# Patient Record
Sex: FEMALE | Race: BLACK OR AFRICAN AMERICAN | Employment: OTHER | URBAN - METROPOLITAN AREA
[De-identification: names, ages, dates, MRNs, and addresses within clinical notes are randomized per-mention and may not be internally consistent; named-entity substitution may affect disease eponyms.]

---

## 2021-01-20 ENCOUNTER — APPOINTMENT (OUTPATIENT)
Dept: GENERAL RADIOLOGY | Age: 76
DRG: 064 | End: 2021-01-20
Attending: EMERGENCY MEDICINE
Payer: MEDICARE

## 2021-01-20 ENCOUNTER — HOSPITAL ENCOUNTER (INPATIENT)
Age: 76
LOS: 6 days | Discharge: NURSING, REHAB OR LONG-TERM CARE | DRG: 064 | End: 2021-01-26
Attending: EMERGENCY MEDICINE | Admitting: INTERNAL MEDICINE
Payer: MEDICARE

## 2021-01-20 ENCOUNTER — APPOINTMENT (OUTPATIENT)
Dept: CT IMAGING | Age: 76
DRG: 064 | End: 2021-01-20
Attending: EMERGENCY MEDICINE
Payer: MEDICARE

## 2021-01-20 DIAGNOSIS — R53.1 WEAKNESS: ICD-10-CM

## 2021-01-20 DIAGNOSIS — R41.82 ALTERED MENTAL STATUS, UNSPECIFIED ALTERED MENTAL STATUS TYPE: Primary | ICD-10-CM

## 2021-01-20 DIAGNOSIS — Z96.652 STATUS POST TOTAL LEFT KNEE REPLACEMENT: ICD-10-CM

## 2021-01-20 LAB
ALBUMIN SERPL-MCNC: 3.4 G/DL (ref 3.5–5)
ALBUMIN/GLOB SERPL: 0.8 {RATIO} (ref 1.1–2.2)
ALP SERPL-CCNC: 75 U/L (ref 45–117)
ALT SERPL-CCNC: 31 U/L (ref 12–78)
ANION GAP SERPL CALC-SCNC: 8 MMOL/L (ref 5–15)
APPEARANCE UR: CLEAR
AST SERPL W P-5'-P-CCNC: 29 U/L (ref 15–37)
ATRIAL RATE: 75 BPM
BACTERIA URNS QL MICRO: NEGATIVE /HPF
BASOPHILS # BLD: 0 K/UL (ref 0–0.1)
BASOPHILS NFR BLD: 1 % (ref 0–1)
BILIRUB SERPL-MCNC: 0.4 MG/DL (ref 0.2–1)
BILIRUB UR QL: NEGATIVE
BNP SERPL-MCNC: 121 PG/ML
BUN SERPL-MCNC: 34 MG/DL (ref 6–20)
BUN/CREAT SERPL: 23 (ref 12–20)
CA-I BLD-MCNC: 9.3 MG/DL (ref 8.5–10.1)
CALCULATED R AXIS, ECG10: 44 DEGREES
CALCULATED T AXIS, ECG11: 57 DEGREES
CHLORIDE SERPL-SCNC: 110 MMOL/L (ref 97–108)
CO2 SERPL-SCNC: 28 MMOL/L (ref 21–32)
COLOR UR: ABNORMAL
COVID-19 RAPID TEST, COVR: NOT DETECTED
CREAT SERPL-MCNC: 1.48 MG/DL (ref 0.55–1.02)
DIAGNOSIS, 93000: NORMAL
DIFFERENTIAL METHOD BLD: ABNORMAL
EOSINOPHIL # BLD: 0.2 K/UL (ref 0–0.4)
EOSINOPHIL NFR BLD: 5 % (ref 0–7)
ERYTHROCYTE [DISTWIDTH] IN BLOOD BY AUTOMATED COUNT: 15 % (ref 11.5–14.5)
ETHANOL SERPL-MCNC: <4 MG/DL
FLUAV AG NPH QL IA: NEGATIVE
FLUBV AG NOSE QL IA: NEGATIVE
GLOBULIN SER CALC-MCNC: 4.4 G/DL (ref 2–4)
GLUCOSE SERPL-MCNC: 102 MG/DL (ref 65–100)
GLUCOSE UR STRIP.AUTO-MCNC: NEGATIVE MG/DL
HCT VFR BLD AUTO: 45.2 % (ref 35–47)
HGB BLD-MCNC: 14.2 G/DL (ref 11.5–16)
HGB UR QL STRIP: NEGATIVE
IMM GRANULOCYTES # BLD AUTO: 0 K/UL (ref 0–0.04)
IMM GRANULOCYTES NFR BLD AUTO: 0 % (ref 0–0.5)
KETONES UR QL STRIP.AUTO: NEGATIVE MG/DL
LEUKOCYTE ESTERASE UR QL STRIP.AUTO: NEGATIVE
LYMPHOCYTES # BLD: 2 K/UL (ref 0.8–3.5)
LYMPHOCYTES NFR BLD: 47 % (ref 12–49)
MCH RBC QN AUTO: 28.7 PG (ref 26–34)
MCHC RBC AUTO-ENTMCNC: 31.4 G/DL (ref 30–36.5)
MCV RBC AUTO: 91.3 FL (ref 80–99)
MONOCYTES # BLD: 0.6 K/UL (ref 0–1)
MONOCYTES NFR BLD: 14 % (ref 5–13)
MUCOUS THREADS URNS QL MICRO: ABNORMAL /LPF
NEUTS SEG # BLD: 1.4 K/UL (ref 1.8–8)
NEUTS SEG NFR BLD: 33 % (ref 32–75)
NITRITE UR QL STRIP.AUTO: NEGATIVE
NRBC # BLD: 0 K/UL (ref 0–0.01)
NRBC BLD-RTO: 0 PER 100 WBC
PH UR STRIP: 5 [PH] (ref 5–8)
PLATELET # BLD AUTO: 304 K/UL (ref 150–400)
PMV BLD AUTO: 11 FL (ref 8.9–12.9)
POTASSIUM SERPL-SCNC: 4.4 MMOL/L (ref 3.5–5.1)
PROT SERPL-MCNC: 7.8 G/DL (ref 6.4–8.2)
PROT UR STRIP-MCNC: NEGATIVE MG/DL
Q-T INTERVAL, ECG07: 434 MS
QRS DURATION, ECG06: 88 MS
QTC CALCULATION (BEZET), ECG08: 451 MS
RBC # BLD AUTO: 4.95 M/UL (ref 3.8–5.2)
RBC #/AREA URNS HPF: ABNORMAL /HPF (ref 0–5)
SARS-COV-2, COV2: NORMAL
SODIUM SERPL-SCNC: 146 MMOL/L (ref 136–145)
SP GR UR REFRACTOMETRY: >1.03 (ref 1–1.03)
SPECIMEN SOURCE: NORMAL
TROPONIN I SERPL-MCNC: <0.05 NG/ML
UA: UC IF INDICATED,UAUC: ABNORMAL
UROBILINOGEN UR QL STRIP.AUTO: 2 EU/DL (ref 0.1–1)
VENTRICULAR RATE, ECG03: 65 BPM
WBC # BLD AUTO: 4.3 K/UL (ref 3.6–11)
WBC URNS QL MICRO: ABNORMAL /HPF (ref 0–4)

## 2021-01-20 PROCEDURE — 84484 ASSAY OF TROPONIN QUANT: CPT

## 2021-01-20 PROCEDURE — 65270000029 HC RM PRIVATE

## 2021-01-20 PROCEDURE — 87804 INFLUENZA ASSAY W/OPTIC: CPT

## 2021-01-20 PROCEDURE — 80053 COMPREHEN METABOLIC PANEL: CPT

## 2021-01-20 PROCEDURE — 85025 COMPLETE CBC W/AUTO DIFF WBC: CPT

## 2021-01-20 PROCEDURE — 71045 X-RAY EXAM CHEST 1 VIEW: CPT

## 2021-01-20 PROCEDURE — 80307 DRUG TEST PRSMV CHEM ANLYZR: CPT

## 2021-01-20 PROCEDURE — 82077 ASSAY SPEC XCP UR&BREATH IA: CPT

## 2021-01-20 PROCEDURE — 87635 SARS-COV-2 COVID-19 AMP PRB: CPT

## 2021-01-20 PROCEDURE — 81001 URINALYSIS AUTO W/SCOPE: CPT

## 2021-01-20 PROCEDURE — 74011000250 HC RX REV CODE- 250: Performed by: EMERGENCY MEDICINE

## 2021-01-20 PROCEDURE — 70450 CT HEAD/BRAIN W/O DYE: CPT

## 2021-01-20 PROCEDURE — 74011250636 HC RX REV CODE- 250/636: Performed by: EMERGENCY MEDICINE

## 2021-01-20 PROCEDURE — 93005 ELECTROCARDIOGRAM TRACING: CPT

## 2021-01-20 PROCEDURE — 83880 ASSAY OF NATRIURETIC PEPTIDE: CPT

## 2021-01-20 RX ORDER — TRIAMTERENE AND HYDROCHLOROTHIAZIDE 37.5; 25 MG/1; MG/1
1 CAPSULE ORAL DAILY
COMMUNITY
End: 2021-01-26

## 2021-01-20 RX ORDER — DILTIAZEM HYDROCHLORIDE 120 MG/1
CAPSULE, COATED, EXTENDED RELEASE ORAL DAILY
COMMUNITY
End: 2021-01-26

## 2021-01-20 RX ORDER — BACLOFEN 10 MG/1
TABLET ORAL 3 TIMES DAILY
COMMUNITY
End: 2021-01-26

## 2021-01-20 RX ORDER — AMIODARONE HYDROCHLORIDE 400 MG/1
400 TABLET ORAL DAILY
COMMUNITY
End: 2021-01-26

## 2021-01-20 RX ORDER — OXYCODONE HYDROCHLORIDE 10 MG/1
10 TABLET ORAL
COMMUNITY
End: 2021-01-26

## 2021-01-20 RX ADMIN — CEFTRIAXONE SODIUM 1 G: 1 INJECTION, POWDER, FOR SOLUTION INTRAMUSCULAR; INTRAVENOUS at 21:20

## 2021-01-20 RX ADMIN — AZITHROMYCIN DIHYDRATE 500 MG: 500 INJECTION, POWDER, LYOPHILIZED, FOR SOLUTION INTRAVENOUS at 21:21

## 2021-01-20 NOTE — ED PROVIDER NOTES
EMERGENCY DEPARTMENT HISTORY AND PHYSICAL EXAM        Date: 1/20/2021  Patient Name: Dyana Alexadner    History of Presenting Illness     Chief Complaint   Patient presents with    Altered mental status     History Provided By: Patient    HPI: Dyana Alexander, 77 y.o. female with history of COPD, hypertension, and stroke who presents with confusion and generalized weakness. Patient has been slower to respond according to family. I spoke with the daughter on the phone. She states that she has been weak since yesterday. She was having difficulty with walking. She also states that she has been more confused than normal.    PCP: Estrellita Yeager MD    Current Outpatient Medications   Medication Sig Dispense Refill    acetaminophen (TYLENOL) 500 mg tablet Take 1 Tab by mouth every four (4) hours (while awake). 100 Tab 0    senna-docusate (PERICOLACE) 8.6-50 mg per tablet Take 1 Tab by mouth two (2) times a day. 60 Tab 0    oxyCODONE IR (ROXICODONE) 5 mg immediate release tablet Take 1-2 Tabs by mouth every four (4) hours as needed for Pain. Max Daily Amount: 60 mg. 100 Tab 0    warfarin (COUMADIN) 2 mg tablet Take 2.5 Tabs by mouth daily. Adjust dose as directed. Indications: DEEP VEIN THROMBOSIS PREVENTION 90 Tab 1    flecainide (TAMBOCOR) 50 mg tablet Take 50 mg by mouth two (2) times a day.  losartan (COZAAR) 50 mg tablet Take 50 mg by mouth daily.  atorvastatin (LIPITOR) 20 mg tablet Take 10 mg by mouth nightly.  fluticasone-salmeterol (ADVAIR) 250-50 mcg/dose diskus inhaler Take 1 Puff by inhalation every twelve (12) hours.  albuterol (PROVENTIL HFA, VENTOLIN HFA, PROAIR HFA) 90 mcg/actuation inhaler Take  by inhalation every four (4) hours as needed for Wheezing.      TERRENCE Renee ARTHRITIS TAKES PRN         Past History     Past Medical History:  Past Medical History:   Diagnosis Date    Arthritis     Chronic obstructive pulmonary disease ([address]     Hypertension     Stroke [facility]) 2011    NO RESIDUAL       Past Surgical History:  Past Surgical History:   Procedure Laterality Date    HX ORTHOPAEDIC Left 2012       Family History:  Family History   Problem Relation Age of Onset    Cancer Mother         RECTAL    Hypertension Mother     Heart Disease Mother     Heart Attack Father     Anesth Problems Neg Hx     Cancer Sister         BONE    Hypertension Sister     Other Sister         ANEURYSM    Cancer Brother         THROAT       Social History:  Social History     Tobacco Use    Smoking status: Former Smoker     Packs/day: 1.50     Years: 15.00     Pack years: 22.50     Quit date: 2012     Years since quittin.0    Smokeless tobacco: Never Used   Substance Use Topics    Alcohol use: Yes     Comment: OCCASIONALLY    Drug use: No       Allergies:  No Known Allergies    Review of Systems   Review of Systems   Constitutional: Negative for fever. HENT: Negative for congestion. Eyes: Negative for visual disturbance. Respiratory: Negative for shortness of breath. Cardiovascular: Negative for chest pain. Gastrointestinal: Negative for abdominal pain. Genitourinary: Negative for dysuria. Musculoskeletal: Negative for arthralgias. Skin: Negative for rash. Neurological: Negative for headaches. Positive for disorientation     Physical Exam   General: No acute distress. Well-nourished. Skin: No rash. Head: Normocephalic. Atraumatic. Eye: No proptosis or conjunctival injections. Respiratory: No apparent respiratory distress. Gastrointestinal: Nondistended. Musculoskeletal: No obvious bony deformities. Neuro: Alert. No obvious focal deficits. Slow to respond to questions. No slurred speech.     Diagnostic Study Results     Labs -     Recent Results (from the past 24 hour(s))   CBC WITH AUTOMATED DIFF    Collection Time: 21  5:15 PM   Result Value Ref Range    WBC 4.3 3.6 - 11.0 K/uL    RBC 4.95 3.80 - 5.20 M/uL    HGB 14.2 11.5 - 16.0 g/dL    HCT 45.2 35.0 - 47.0 %    MCV 91.3 80.0 - 99.0 FL    MCH 28.7 26.0 - 34.0 PG    MCHC 31.4 30.0 - 36.5 g/dL    RDW 15.0 (H) 11.5 - 14.5 %    PLATELET 081 770 - 398 K/uL    MPV 11.0 8.9 - 12.9 FL    NRBC 0.0 0  WBC    ABSOLUTE NRBC 0.00 0.00 - 0.01 K/uL    NEUTROPHILS 33 32 - 75 %    LYMPHOCYTES 47 12 - 49 %    MONOCYTES 14 (H) 5 - 13 %    EOSINOPHILS 5 0 - 7 %    BASOPHILS 1 0 - 1 %    IMMATURE GRANULOCYTES 0 0.0 - 0.5 %    ABS. NEUTROPHILS 1.4 (L) 1.8 - 8.0 K/UL    ABS. LYMPHOCYTES 2.0 0.8 - 3.5 K/UL    ABS. MONOCYTES 0.6 0.0 - 1.0 K/UL    ABS. EOSINOPHILS 0.2 0.0 - 0.4 K/UL    ABS. BASOPHILS 0.0 0.0 - 0.1 K/UL    ABS. IMM. GRANS. 0.0 0.00 - 0.04 K/UL    DF AUTOMATED     METABOLIC PANEL, COMPREHENSIVE    Collection Time: 01/20/21  5:15 PM   Result Value Ref Range    Sodium 146 (H) 136 - 145 mmol/L    Potassium 4.4 3.5 - 5.1 mmol/L    Chloride 110 (H) 97 - 108 mmol/L    CO2 28 21 - 32 mmol/L    Anion gap 8 5 - 15 mmol/L    Glucose 102 (H) 65 - 100 mg/dL    BUN 34 (H) 6 - 20 mg/dL    Creatinine 1.48 (H) 0.55 - 1.02 mg/dL    BUN/Creatinine ratio 23 (H) 12 - 20      GFR est AA 42 (L) >60 ml/min/1.73m2    GFR est non-AA 34 (L) >60 ml/min/1.73m2    Calcium 9.3 8.5 - 10.1 mg/dL    Bilirubin, total 0.4 0.2 - 1.0 mg/dL    AST (SGOT) 29 15 - 37 U/L    ALT (SGPT) 31 12 - 78 U/L    Alk.  phosphatase 75 45 - 117 U/L    Protein, total 7.8 6.4 - 8.2 g/dL    Albumin 3.4 (L) 3.5 - 5.0 g/dL    Globulin 4.4 (H) 2.0 - 4.0 g/dL    A-G Ratio 0.8 (L) 1.1 - 2.2     TROPONIN I    Collection Time: 01/20/21  5:15 PM   Result Value Ref Range    Troponin-I, Qt. <0.05 <0.05 ng/mL   BNP    Collection Time: 01/20/21  5:15 PM   Result Value Ref Range    NT pro- <125 pg/mL   ETHYL ALCOHOL    Collection Time: 01/20/21  5:20 PM   Result Value Ref Range    ALCOHOL(ETHYL),SERUM <4 <10 mg/dL   INFLUENZA A & B AG (RAPID TEST)    Collection Time: 01/20/21  6:05 PM   Result Value Ref Range    Influenza A Antigen Negative Negative      Influenza B Antigen Negative Negative         Radiologic Studies -   CT HEAD WO CONT   Final Result   Impression:   1. No evidence of an acute intracranial abnormality. 2.  Multifocal chronic infarcts/encephalomalacia and evidence of chronic small   vessel ischemic changes. XR CHEST SNGL V   Final Result        CT Results  (Last 48 hours)               01/20/21 1835  CT HEAD WO CONT Final result    Impression:  Impression:   1. No evidence of an acute intracranial abnormality. 2.  Multifocal chronic infarcts/encephalomalacia and evidence of chronic small   vessel ischemic changes. Narrative:  Study: Head CT without contrast.       Clinical Indication: Altered mental status. Comparison: None available. Technique: Routine volume acquisition of the head was performed without contrast   using soft tissue and bone kernels. Coronal and sagittal reconstructions were   generated and reviewed. Dose reduction: All CT scans at this facility are   performed using dose reduction optimization techniques as appropriate to a   performed exam including the following-automated exposure control, adjustments   of mA and/or Kv according to patient size, or use of iterative reconstructive   technique. Findings:       Generalized volume loss and regions of bifrontal and right parietal lobe   encephalomalacia with commensurate prominence of the ventricles and sulci. No midline shift. The basal cisterns are patent. Chronic bilateral cerebellar   infarcts. No acute hemorrhage, abnormal extra-axial fluid, or evidence of large   territorial ischemia. Scattered foci of hypodensity involving the cerebral   hemispheric white matter are nonspecific but most commonly associated with   chronic small vessel ischemic changes. Dense intracranial vascular   calcifications. Absent native lenses. The included paranasal sinuses and mastoid air cells are   clear.  No evidence of an aggressive calvarial or extracalvarial lesion. CXR Results  (Last 48 hours)               01/20/21 1756  XR CHEST SNGL V Final result    Narrative: Indication: Weakness. AP portable upright chest radiograph 20 January 2021 1730 hours. No comparison. Underexpanded lungs. Heterogeneous bilateral airspace opacities predominating in the perihilar areas   and left lung base. These are relatively linear, more likely atelectasis than   pneumonia although pneumonia is not excluded. Mild cardiomegaly. No pneumothorax or pleural effusion. Medical Decision Making and ED Course     I reviewed the available vital signs, nursing notes, past medical history, past surgical history, family history, and social history. Vital Signs - Reviewed the patient's vital signs. Patient Vitals for the past 12 hrs:   Temp Pulse Resp BP SpO2   01/20/21 1809     97 %   01/20/21 1712 98 °F (36.7 °C) 66 20 126/89 97 %       EKG interpretation: Obtained on 1/20/2021 at 1719. Atrial fibrillation at rate of 65 bpm.  AL interval is indistinguishable. QRS duration and QTc intervals are normal.  Normal axis. Medical Decision Making:   Presented with confusion. The differential diagnosis is urinary tract infection, encephalopathy, intracranial hemorrhage, CVA, electrolyte abnormality, polypharmacy. Discussed with hospital doctor and will admit for altered mental status and weakness. Does not appear that she has had a stroke clinically. CT head is unremarkable. Urinalysis is pending. I do suspect UTI may be the cause. Does have signs of possible pneumonia on chest x-ray. Will test for COVID-19 and treat with azithromycin and Rocephin. Pneumonia is lower suspicion as patient has not had fever, cough or dyspnea. Disposition     Admitted to Dr. Sung Found    Diagnosis     Clinical impression:   1. Altered mental status, unspecified altered mental status type    2.  Weakness Attestation:  Please note that this dictation was completed with [employer], the computer voice recognition software. Quite often unanticipated grammatical, syntax, homophones, and other interpretive errors are inadvertently transcribed by the computer software. Please disregard these errors. Please excuse any errors that have escaped final proofreading. Thank you.   Florecita Chavez, DO

## 2021-01-20 NOTE — Clinical Note
Status[de-identified] INPATIENT [101]   Type of Bed: Remote Telemetry [29]   Inpatient Hospitalization Certified Necessary for the Following Reasons: 3.  Patient receiving treatment that can only be provided in an inpatient setting (further clarification in H&P documentation)   Admitting Diagnosis: Encephalopathy [[id number]]   Admitting Physician: Salomon Lujan   Attending Physician: Salomon Lujan   Estimated Length of Stay: 2 Midnights   Discharge Plan[de-identified] Home with Office Follow-up

## 2021-01-20 NOTE — ED TRIAGE NOTES
GENERALIZED WEAKNESS, GCS 15, PER FAMILY PT SEEMS TO BE SOMEWHAT ALTERED. EMS REPORTS SLOWER TO RESPOND TO ANSWER QUESTIONS ALTHOUGH CORRECT.

## 2021-01-21 ENCOUNTER — APPOINTMENT (OUTPATIENT)
Dept: MRI IMAGING | Age: 76
DRG: 064 | End: 2021-01-21
Attending: PSYCHIATRY & NEUROLOGY
Payer: MEDICARE

## 2021-01-21 PROBLEM — G93.40 ENCEPHALOPATHY: Status: ACTIVE | Noted: 2021-01-21

## 2021-01-21 LAB
AMPHET UR QL SCN: NEGATIVE
BARBITURATES UR QL SCN: NEGATIVE
BENZODIAZ UR QL: NEGATIVE
CANNABINOIDS UR QL SCN: NEGATIVE
COCAINE UR QL SCN: NEGATIVE
DRUG SCRN COMMENT,DRGCM: ABNORMAL
GLUCOSE BLD STRIP.AUTO-MCNC: 110 MG/DL (ref 65–100)
METHADONE UR QL: NEGATIVE
OPIATES UR QL: POSITIVE
PCP UR QL: NEGATIVE
PERFORMED BY, TECHID: ABNORMAL
TROPONIN I SERPL-MCNC: <0.05 NG/ML

## 2021-01-21 PROCEDURE — 74011000258 HC RX REV CODE- 258: Performed by: INTERNAL MEDICINE

## 2021-01-21 PROCEDURE — 36415 COLL VENOUS BLD VENIPUNCTURE: CPT

## 2021-01-21 PROCEDURE — 94640 AIRWAY INHALATION TREATMENT: CPT

## 2021-01-21 PROCEDURE — 74011250637 HC RX REV CODE- 250/637: Performed by: INTERNAL MEDICINE

## 2021-01-21 PROCEDURE — 84484 ASSAY OF TROPONIN QUANT: CPT

## 2021-01-21 PROCEDURE — 95816 EEG AWAKE AND DROWSY: CPT | Performed by: INTERNAL MEDICINE

## 2021-01-21 PROCEDURE — 70551 MRI BRAIN STEM W/O DYE: CPT

## 2021-01-21 PROCEDURE — 65270000029 HC RM PRIVATE

## 2021-01-21 PROCEDURE — 82962 GLUCOSE BLOOD TEST: CPT

## 2021-01-21 RX ORDER — DEXTROSE MONOHYDRATE AND SODIUM CHLORIDE 5; .45 G/100ML; G/100ML
75 INJECTION, SOLUTION INTRAVENOUS CONTINUOUS
Status: DISCONTINUED | OUTPATIENT
Start: 2021-01-21 | End: 2021-01-26 | Stop reason: HOSPADM

## 2021-01-21 RX ORDER — DILTIAZEM HYDROCHLORIDE 120 MG/1
120 CAPSULE, COATED, EXTENDED RELEASE ORAL DAILY
Status: DISCONTINUED | OUTPATIENT
Start: 2021-01-21 | End: 2021-01-26 | Stop reason: HOSPADM

## 2021-01-21 RX ORDER — ATORVASTATIN CALCIUM 10 MG/1
10 TABLET, FILM COATED ORAL
Status: DISCONTINUED | OUTPATIENT
Start: 2021-01-21 | End: 2021-01-26 | Stop reason: HOSPADM

## 2021-01-21 RX ORDER — BACLOFEN 10 MG/1
5 TABLET ORAL 3 TIMES DAILY
Status: DISCONTINUED | OUTPATIENT
Start: 2021-01-21 | End: 2021-01-26 | Stop reason: HOSPADM

## 2021-01-21 RX ORDER — AMIODARONE HYDROCHLORIDE 200 MG/1
400 TABLET ORAL DAILY
Status: DISCONTINUED | OUTPATIENT
Start: 2021-01-21 | End: 2021-01-26 | Stop reason: HOSPADM

## 2021-01-21 RX ORDER — SODIUM CHLORIDE 0.9 % (FLUSH) 0.9 %
5-40 SYRINGE (ML) INJECTION AS NEEDED
Status: DISCONTINUED | OUTPATIENT
Start: 2021-01-21 | End: 2021-01-26 | Stop reason: HOSPADM

## 2021-01-21 RX ORDER — ACETAMINOPHEN 500 MG
500 TABLET ORAL
Status: DISCONTINUED | OUTPATIENT
Start: 2021-01-21 | End: 2021-01-26 | Stop reason: HOSPADM

## 2021-01-21 RX ORDER — ALBUTEROL SULFATE 90 UG/1
2 AEROSOL, METERED RESPIRATORY (INHALATION)
Status: DISCONTINUED | OUTPATIENT
Start: 2021-01-21 | End: 2021-01-26 | Stop reason: HOSPADM

## 2021-01-21 RX ORDER — BISACODYL 5 MG
5 TABLET, DELAYED RELEASE (ENTERIC COATED) ORAL DAILY PRN
Status: DISCONTINUED | OUTPATIENT
Start: 2021-01-21 | End: 2021-01-26 | Stop reason: HOSPADM

## 2021-01-21 RX ORDER — AMOXICILLIN 250 MG
1 CAPSULE ORAL 2 TIMES DAILY
Status: DISCONTINUED | OUTPATIENT
Start: 2021-01-21 | End: 2021-01-26 | Stop reason: HOSPADM

## 2021-01-21 RX ORDER — LOSARTAN POTASSIUM 50 MG/1
50 TABLET ORAL DAILY
Status: DISCONTINUED | OUTPATIENT
Start: 2021-01-21 | End: 2021-01-26 | Stop reason: HOSPADM

## 2021-01-21 RX ORDER — SODIUM CHLORIDE 0.9 % (FLUSH) 0.9 %
5-40 SYRINGE (ML) INJECTION EVERY 8 HOURS
Status: DISCONTINUED | OUTPATIENT
Start: 2021-01-21 | End: 2021-01-26 | Stop reason: HOSPADM

## 2021-01-21 RX ORDER — TRAMADOL HYDROCHLORIDE 50 MG/1
50 TABLET ORAL
Status: DISCONTINUED | OUTPATIENT
Start: 2021-01-21 | End: 2021-01-26 | Stop reason: HOSPADM

## 2021-01-21 RX ORDER — BUDESONIDE AND FORMOTEROL FUMARATE DIHYDRATE 160; 4.5 UG/1; UG/1
2 AEROSOL RESPIRATORY (INHALATION)
Status: DISCONTINUED | OUTPATIENT
Start: 2021-01-21 | End: 2021-01-26 | Stop reason: HOSPADM

## 2021-01-21 RX ORDER — FLECAINIDE ACETATE 50 MG/1
50 TABLET ORAL 2 TIMES DAILY
Status: DISCONTINUED | OUTPATIENT
Start: 2021-01-21 | End: 2021-01-26 | Stop reason: HOSPADM

## 2021-01-21 RX ADMIN — BACLOFEN 5 MG: 10 TABLET ORAL at 21:49

## 2021-01-21 RX ADMIN — Medication 10 ML: at 14:00

## 2021-01-21 RX ADMIN — DILTIAZEM HYDROCHLORIDE 120 MG: 120 CAPSULE, COATED, EXTENDED RELEASE ORAL at 11:28

## 2021-01-21 RX ADMIN — Medication 10 ML: at 22:00

## 2021-01-21 RX ADMIN — RIVAROXABAN 20 MG: 10 TABLET, FILM COATED ORAL at 16:02

## 2021-01-21 RX ADMIN — ACETAMINOPHEN 500 MG: 500 TABLET, FILM COATED ORAL at 11:28

## 2021-01-21 RX ADMIN — AMIODARONE HYDROCHLORIDE 400 MG: 200 TABLET ORAL at 11:29

## 2021-01-21 RX ADMIN — DEXTROSE AND SODIUM CHLORIDE 75 ML/HR: 5; 450 INJECTION, SOLUTION INTRAVENOUS at 11:27

## 2021-01-21 RX ADMIN — ATORVASTATIN CALCIUM 10 MG: 10 TABLET, FILM COATED ORAL at 21:49

## 2021-01-21 RX ADMIN — FLECAINIDE ACETATE 50 MG: 50 TABLET ORAL at 11:28

## 2021-01-21 RX ADMIN — DOCUSATE SODIUM 50 MG AND SENNOSIDES 8.6 MG 1 TABLET: 8.6; 5 TABLET, FILM COATED ORAL at 11:29

## 2021-01-21 RX ADMIN — TRAMADOL HYDROCHLORIDE 50 MG: 50 TABLET, COATED ORAL at 16:02

## 2021-01-21 RX ADMIN — BACLOFEN 5 MG: 10 TABLET ORAL at 11:30

## 2021-01-21 RX ADMIN — LOSARTAN POTASSIUM 50 MG: 50 TABLET, FILM COATED ORAL at 11:28

## 2021-01-21 RX ADMIN — BUDESONIDE AND FORMOTEROL FUMARATE DIHYDRATE 2 PUFF: 160; 4.5 AEROSOL RESPIRATORY (INHALATION) at 20:46

## 2021-01-21 RX ADMIN — ACETAMINOPHEN 500 MG: 500 TABLET, FILM COATED ORAL at 21:49

## 2021-01-21 RX ADMIN — BACLOFEN 5 MG: 10 TABLET ORAL at 16:02

## 2021-01-21 RX ADMIN — FLECAINIDE ACETATE 50 MG: 50 TABLET ORAL at 21:49

## 2021-01-21 RX ADMIN — DOCUSATE SODIUM 50 MG AND SENNOSIDES 8.6 MG 1 TABLET: 8.6; 5 TABLET, FILM COATED ORAL at 21:49

## 2021-01-21 NOTE — ED NOTES
Moved into Room. Placed on monitors. Awake and alert. Disoriented to time and place, but knows name unable to recall . Skin warm and dry. Repositioned. Diaper removed and it was dry. No complaints and is resting comfortably at this time.

## 2021-01-21 NOTE — H&P
History and Physical    Patient: Sara Valles MRN: [id number]  SSN: [id number]    YOB: 1945  Age: 77 y.o. Sex: female      Subjective:      Sara Valles is a 77 y.o. female who has a past medical history of COPD, hypertension, CVA, arthritis came to the emergency room because of confusion. Patient is unable to give much history she denies any chest pain no shortness of breath no cough no chills denies any history of exposure to COVID-19 department CT scan of the brain cephalomalacia with chronic lacunar infarcts. Past Medical History:   Diagnosis Date    Arthritis     [facility] obstructive pulmonary disease ([facility] [address]     Hypertension     Stroke [facility]) 2011    NO RESIDUAL     Past Surgical History:   Procedure Laterality Date    HX ORTHOPAEDIC Left 2012      Family History   Problem Relation Age of Onset    Cancer Mother         RECTAL    Hypertension Mother     Heart Disease Mother     Heart Attack Father     Cancer Sister         BONE    Hypertension Sister     Other Sister         ANEURYSM    Cancer Brother         THROAT    Anesth Problems Neg Hx      Social History     Tobacco Use    Smoking status: Former Smoker     Packs/day: 1.50     Years: 15.00     Pack years: 22.50     Quit date: 2012     Years since quittin.0    Smokeless tobacco: Never Used   Substance Use Topics    Alcohol use: Yes     Comment: OCCASIONALLY      Prior to Admission medications    Medication Sig Start Date End Date Taking? Authorizing Provider   oxyCODONE IR (ROXICODONE) 10 mg tab immediate release tablet Take 10 mg by mouth every six (6) hours as needed for Pain. Yes Keegan, MD Haley   amiodarone (PACERONE) 400 mg tablet Take 400 mg by mouth daily. Yes Keegan, MD Haley   baclofen (LIORESAL) 10 mg tablet Take  by mouth three (3) times daily. Yes Keegan, MD Haley   triamterene-hydroCHLOROthiazide (DYAZIDE) 37.5-25 mg per capsule Take  by mouth daily.    Yes Haley Allison MD   dilTIAZem ER (Cardizem CD) 120 mg capsule Take  by mouth daily. Yes Other, MD Haley   rivaroxaban (Xarelto) 20 mg tab tablet Take 20 mg by mouth daily. Yes Other, MD Haley   acetaminophen (TYLENOL) 500 mg tablet Take 1 Tab by mouth every four (4) hours (while awake). 1/1/16   Vani Jarquin MD   senna-docusate (PERICOLACE) 8.6-50 mg per tablet Take 1 Tab by mouth two (2) times a day. 1/1/16   Vani Jarquin MD   oxyCODONE IR (ROXICODONE) 5 mg immediate release tablet Take 1-2 Tabs by mouth every four (4) hours as needed for Pain. Max Daily Amount: 60 mg. 1/1/16   Vani Jarquin MD   warfarin (COUMADIN) 2 mg tablet Take 2.5 Tabs by mouth daily. Adjust dose as directed. Indications: DEEP VEIN THROMBOSIS PREVENTION 1/1/16   Vani Jarquin MD   flecainide [facility] [place]) 50 mg tablet Take 50 mg by mouth two (2) times a day. Provider, Historical   losartan (COZAAR) 50 mg tablet Take 50 mg by mouth daily. Provider, Historical   atorvastatin (LIPITOR) 20 mg tablet Take 10 mg by mouth nightly. Provider, Historical   fluticasone-salmeterol (ADVAIR) 250-50 mcg/dose diskus inhaler Take 1 Puff by inhalation every twelve (12) hours. Provider, Historical   albuterol (PROVENTIL HFA, VENTOLIN HFA, PROAIR HFA) 90 mcg/actuation inhaler Take  by inhalation every four (4) hours as needed for Wheezing. Provider, Historical   OTHER,NON-FORMULARY, BC ARTHRITIS TAKES PRN    Provider, Historical        No Known Allergies    Review of Systems:  A comprehensive review of systems was negative except for that written in the History of Present Illness. Objective:     Vitals:    01/20/21 1809 01/21/21 0521 01/21/21 0530 01/21/21 0645   BP:  (!) 141/83 (!) 141/83 135/84   Pulse:  76 79 76   Resp:  20 16 16   Temp:       SpO2: 97% 97% 98% 97%   Weight:       Height:            Physical Exam:  General:  Alert, cooperative, no distress, appears stated age. Eyes:  Conjunctivae/corneas clear. PERRL, EOMs intact.  Fundi benign   Ears: Normal TMs and external ear canals both ears. Nose: Nares normal. Septum midline. Mucosa normal. No drainage or sinus tenderness. Mouth/Throat: Lips, mucosa, and tongue normal. Teeth and gums normal.   Neck: Supple, symmetrical, trachea midline, no adenopathy, thyroid: no enlargment/tenderness/nodules, no carotid bruit and no JVD. Back:   Symmetric, no curvature. ROM normal. No CVA tenderness. Lungs:   Clear to auscultation bilaterally. Heart:  Regular rate and rhythm, S1, S2 normal, no murmur, click, rub or gallop. Abdomen:   Soft, non-tender. Bowel sounds normal. No masses,  No organomegaly. Extremities: Extremities normal, atraumatic, no cyanosis or edema. Pulses: 2+ and symmetric all extremities. Skin: Skin color, texture, turgor normal. No rashes or lesions   Lymph nodes: Cervical, supraclavicular, and axillary nodes normal.   Neurologic: CNII-XII intact. Normal strength, sensation and reflexes throughout. Recent Results (from the past 24 hour(s))   CBC WITH AUTOMATED DIFF    Collection Time: 01/20/21  5:15 PM   Result Value Ref Range    WBC 4.3 3.6 - 11.0 K/uL    RBC 4.95 3.80 - 5.20 M/uL    HGB 14.2 11.5 - 16.0 g/dL    HCT 45.2 35.0 - 47.0 %    MCV 91.3 80.0 - 99.0 FL    MCH 28.7 26.0 - 34.0 PG    MCHC 31.4 30.0 - 36.5 g/dL    RDW 15.0 (H) 11.5 - 14.5 %    PLATELET 139 058 - 883 K/uL    MPV 11.0 8.9 - 12.9 FL    NRBC 0.0 0  WBC    ABSOLUTE NRBC 0.00 0.00 - 0.01 K/uL    NEUTROPHILS 33 32 - 75 %    LYMPHOCYTES 47 12 - 49 %    MONOCYTES 14 (H) 5 - 13 %    EOSINOPHILS 5 0 - 7 %    BASOPHILS 1 0 - 1 %    IMMATURE GRANULOCYTES 0 0.0 - 0.5 %    ABS. NEUTROPHILS 1.4 (L) 1.8 - 8.0 K/UL    ABS. LYMPHOCYTES 2.0 0.8 - 3.5 K/UL    ABS. MONOCYTES 0.6 0.0 - 1.0 K/UL    ABS. EOSINOPHILS 0.2 0.0 - 0.4 K/UL    ABS. BASOPHILS 0.0 0.0 - 0.1 K/UL    ABS. IMM.  GRANS. 0.0 0.00 - 0.04 K/UL    DF AUTOMATED     METABOLIC PANEL, COMPREHENSIVE    Collection Time: 01/20/21  5:15 PM   Result Value Ref Range Sodium 146 (H) 136 - 145 mmol/L    Potassium 4.4 3.5 - 5.1 mmol/L    Chloride 110 (H) 97 - 108 mmol/L    CO2 28 21 - 32 mmol/L    Anion gap 8 5 - 15 mmol/L    Glucose 102 (H) 65 - 100 mg/dL    BUN 34 (H) 6 - 20 mg/dL    Creatinine 1.48 (H) 0.55 - 1.02 mg/dL    BUN/Creatinine ratio 23 (H) 12 - 20      GFR est AA 42 (L) >60 ml/min/1.73m2    GFR est non-AA 34 (L) >60 ml/min/1.73m2    Calcium 9.3 8.5 - 10.1 mg/dL    Bilirubin, total 0.4 0.2 - 1.0 mg/dL    AST (SGOT) 29 15 - 37 U/L    ALT (SGPT) 31 12 - 78 U/L    Alk.  phosphatase 75 45 - 117 U/L    Protein, total 7.8 6.4 - 8.2 g/dL    Albumin 3.4 (L) 3.5 - 5.0 g/dL    Globulin 4.4 (H) 2.0 - 4.0 g/dL    A-G Ratio 0.8 (L) 1.1 - 2.2     TROPONIN I    Collection Time: 01/20/21  5:15 PM   Result Value Ref Range    Troponin-I, Qt. <0.05 <0.05 ng/mL   BNP    Collection Time: 01/20/21  5:15 PM   Result Value Ref Range    NT pro- <125 pg/mL   EKG, 12 LEAD, INITIAL    Collection Time: 01/20/21  5:19 PM   Result Value Ref Range    Ventricular Rate 65 BPM    Atrial Rate 75 BPM    QRS Duration 88 ms    Q-T Interval 434 ms    QTC Calculation (Bezet) 451 ms    Calculated R Axis 44 degrees    Calculated T Axis 57 degrees    Diagnosis       Atrial fibrillation  Abnormal ECG  When compared with ECG of 27-JUN-2012 09:53,  Atrial fibrillation has replaced Sinus rhythm  Confirmed by Candi Morris ([id number]) on 1/20/2021 8:33:16 PM     ETHYL ALCOHOL    Collection Time: 01/20/21  5:20 PM   Result Value Ref Range    ALCOHOL(ETHYL),SERUM <4 <10 mg/dL   INFLUENZA A & B AG (RAPID TEST)    Collection Time: 01/20/21  6:05 PM   Result Value Ref Range    Influenza A Antigen Negative Negative      Influenza B Antigen Negative Negative     URINALYSIS W/ REFLEX CULTURE    Collection Time: 01/20/21  8:38 PM    Specimen: Urine   Result Value Ref Range    Color Yellow/Straw      Appearance Clear Clear      Specific gravity >1.030 (H) 1.003 - 1.030    pH (UA) 5.0 5.0 - 8.0      Protein Negative Negative mg/dL    Glucose Negative Negative mg/dL    Ketone Negative Negative mg/dL    Bilirubin Negative Negative      Blood Negative Negative      Urobilinogen 2.0 (H) 0.1 - 1.0 EU/dL    Nitrites Negative Negative      Leukocyte Esterase Negative Negative      UA:UC IF INDICATED Culture not indicated by UA result Culture not indicated by UA result      WBC 0-4 0 - 4 /hpf    RBC 0-5 0 - 5 /hpf    Bacteria Negative Negative /hpf    Mucus Trace /lpf   SARS-COV-2    Collection Time: 01/20/21  9:09 PM   Result Value Ref Range    SARS-CoV-2 Please find results under separate order     COVID-19 RAPID TEST    Collection Time: 01/20/21  9:09 PM   Result Value Ref Range    Specimen source Nasopharyngeal      COVID-19 rapid test Not Detected Not Detected     DRUG SCREEN, URINE    Collection Time: 01/20/21 11:07 PM   Result Value Ref Range    AMPHETAMINES Negative Negative      BARBITURATES Negative Negative      BENZODIAZEPINES Negative Negative      COCAINE Negative Negative      METHADONE Negative Negative      OPIATES Positive (A) Negative      PCP(PHENCYCLIDINE) Negative Negative      THC (TH-CANNABINOL) Negative Negative      Drug screen comment        This test is a screen for drugs of abuse in a medical setting only (i.e., they are unconfirmed results and as such must not be used for non-medical purposes, e.g.,employment testing, legal testing). Due to its inherent nature, false positive (FP) and false negative (FN) results may be obtained. Therefore, if necessary for medical care, recommend confirmation of positive findings by GC/MS.         Assessment:     Hospital Problems  Never Reviewed          Codes Class Noted POA    AMS (altered mental status) ICD-10-CM: R41.82  ICD-9-CM: 780.97  1/20/2021 Unknown           seizure from encephalomalacia   Opiate induced altered mental status DC opiates  Multi-infarct dementia    Plan:     Consult neurology and obtain EEG    Signed By: Jaswant Tanner MD     January 21, 2021

## 2021-01-21 NOTE — ED NOTES
Patient bedside report taken from [facility]. Care assumed. Patient resting comfortably. Patient denies pain. Patient denies difficulty breathing, denies chest pain, denies abdominal pain. Bed in lowest position, with bed rails up x 1. Nursing call bell within patient reach. Will continue to monitor.

## 2021-01-21 NOTE — ED NOTES
Bedside shift change report given to Caitlyn Lin RN (oncoming nurse) by Chip Hernandez RN (offgoing nurse). Report included the following information SBAR, Kardex, Intake/Output, MAR and Recent Results.

## 2021-01-21 NOTE — ED NOTES
Patient soiled self with urine. Cleaned up by ANALI Quinn and Pure Nelma Hoit placed to prevent accidents. BGL obtained by by ED Tech, result 110.

## 2021-01-22 ENCOUNTER — APPOINTMENT (OUTPATIENT)
Dept: NON INVASIVE DIAGNOSTICS | Age: 76
DRG: 064 | End: 2021-01-22
Attending: INTERNAL MEDICINE
Payer: MEDICARE

## 2021-01-22 PROBLEM — I63.9 STROKE (CEREBRUM) (HCC): Status: ACTIVE | Noted: 2021-01-22

## 2021-01-22 LAB
ECHO AO ROOT DIAM: 3.3 CM
ECHO AR MAX VEL PISA: 377 CM/S
ECHO AV PEAK GRADIENT: 11 MMHG
ECHO AV REGURGITANT PHT: 531 MS
ECHO EST RA PRESSURE: 3 MMHG
ECHO LA AREA 4C: 33.47 CM2
ECHO LA MAJOR AXIS: 4.3 CM
ECHO LA MINOR AXIS: 2.17 CM
ECHO LV EDV A2C: 51.9 CM3
ECHO LV EJECTION FRACTION BIPLANE: 73 % (ref 55–100)
ECHO LV ESV A2C: 10.5 CM3
ECHO LV INTERNAL DIMENSION DIASTOLIC: 3.73 CM (ref 3.9–5.3)
ECHO LV INTERNAL DIMENSION SYSTOLIC: 2.19 CM
ECHO LV IVSD: 1.13 CM (ref 0.6–0.9)
ECHO LV MASS 2D: 129.2 G (ref 67–162)
ECHO LV MASS INDEX 2D: 65.3 G/M2 (ref 43–95)
ECHO LV POSTERIOR WALL DIASTOLIC: 1.05 CM (ref 0.6–0.9)
ECHO LVOT PEAK GRADIENT: 5 MMHG
ECHO MV A VELOCITY: 70.3 CM/S
ECHO MV AREA PHT: 2.42 CM2
ECHO MV E DECELERATION TIME (DT): 342 MS
ECHO MV E VELOCITY: 166 CM/S
ECHO MV E/A RATIO: 2.36
ECHO MV PRESSURE HALF TIME (PHT): 91 MS
ECHO PV PEAK INSTANTANEOUS GRADIENT SYSTOLIC: 3 MMHG
ECHO PV PEAK INSTANTANEOUS GRADIENT SYSTOLIC: 8 MMHG
ECHO PV REGURGITANT MAX VELOCITY: 112 CM/S
ECHO PV REGURGITANT MAX VELOCITY: 143 CM/S
ECHO PV REGURGITANT MAX VELOCITY: 165 CM/S
ECHO PV REGURGITANT MAX VELOCITY: 534 CM/S
ECHO PV REGURGITANT MAX VELOCITY: 89 CM/S
ECHO PVEIN A DURATION: 127 MS
ECHO PVEIN A VELOCITY: 31.6 CM/S
ECHO RA AREA 4C: 18.94 CM2
ECHO RIGHT VENTRICULAR SYSTOLIC PRESSURE (RVSP): 48 MMHG
ECHO RV INTERNAL DIMENSION: 2.61 CM
ECHO TV MAX VELOCITY: 337 CM/S
ECHO TV REGURGITANT PEAK GRADIENT: 45 MMHG
GLUCOSE BLD STRIP.AUTO-MCNC: 120 MG/DL (ref 65–100)
LEFT CCA DIST DIAS: 14.5 CM/S
LEFT CCA DIST SYS: 71.4 CM/S
LEFT CCA PROX DIAS: 13.4 CM/S
LEFT CCA PROX SYS: 67.1 CM/S
LEFT ECA DIAS: 10.2 CM/S
LEFT ECA SYS: 69.3 CM/S
LEFT ICA DIST DIAS: 16.3 CM/S
LEFT ICA DIST SYS: 41.8 CM/S
LEFT ICA PROX DIAS: 14 CM/S
LEFT ICA PROX SYS: 55.9 CM/S
LEFT SUBCLAVIAN DIAS: 0 CM/S
LEFT SUBCLAVIAN SYS: 49.4 CM/S
LEFT VERTEBRAL DIAS: 0 CM/S
LEFT VERTEBRAL SYS: 40.8 CM/S
MV DEC SLOPE: 6980 MM/S2
MV DEC SLOPE: 6980 MM/S2
PERFORMED BY, TECHID: ABNORMAL
RIGHT CCA DIST DIAS: 11.3 CM/S
RIGHT CCA DIST SYS: 47.8 CM/S
RIGHT CCA PROX DIAS: 10.2 CM/S
RIGHT CCA PROX SYS: 54.8 CM/S
RIGHT ECA DIAS: 5.91 CM/S
RIGHT ECA SYS: 58.5 CM/S
RIGHT ICA DIST DIAS: 19.3 CM/S
RIGHT ICA DIST SYS: 69.3 CM/S
RIGHT ICA PROX DIAS: 12.4 CM/S
RIGHT ICA PROX SYS: 43 CM/S
RIGHT SUBCLAVIAN DIAS: 0 CM/S
RIGHT SUBCLAVIAN SYS: 63.4 CM/S
RIGHT VERTEBRAL DIAS: 9.67 CM/S
RIGHT VERTEBRAL SYS: 52.1 CM/S

## 2021-01-22 PROCEDURE — 82962 GLUCOSE BLOOD TEST: CPT

## 2021-01-22 PROCEDURE — 93306 TTE W/DOPPLER COMPLETE: CPT

## 2021-01-22 PROCEDURE — 65270000029 HC RM PRIVATE

## 2021-01-22 PROCEDURE — 93880 EXTRACRANIAL BILAT STUDY: CPT

## 2021-01-22 PROCEDURE — 94640 AIRWAY INHALATION TREATMENT: CPT

## 2021-01-22 PROCEDURE — 92610 EVALUATE SWALLOWING FUNCTION: CPT

## 2021-01-22 PROCEDURE — 95816 EEG AWAKE AND DROWSY: CPT | Performed by: INTERNAL MEDICINE

## 2021-01-22 PROCEDURE — 74011250637 HC RX REV CODE- 250/637: Performed by: INTERNAL MEDICINE

## 2021-01-22 RX ORDER — CLOPIDOGREL BISULFATE 75 MG/1
75 TABLET ORAL DAILY
Status: CANCELLED | OUTPATIENT
Start: 2021-01-23

## 2021-01-22 RX ORDER — ACETAMINOPHEN 650 MG/1
650 SUPPOSITORY RECTAL
Status: DISCONTINUED | OUTPATIENT
Start: 2021-01-22 | End: 2021-01-26 | Stop reason: HOSPADM

## 2021-01-22 RX ORDER — ENOXAPARIN SODIUM 100 MG/ML
30 INJECTION SUBCUTANEOUS EVERY 12 HOURS
Status: CANCELLED | OUTPATIENT
Start: 2021-01-22 | End: 2021-01-25

## 2021-01-22 RX ORDER — DOCUSATE SODIUM 100 MG/1
100 CAPSULE, LIQUID FILLED ORAL 2 TIMES DAILY
Status: DISCONTINUED | OUTPATIENT
Start: 2021-01-22 | End: 2021-01-26 | Stop reason: HOSPADM

## 2021-01-22 RX ORDER — FAMOTIDINE 20 MG/1
20 TABLET, FILM COATED ORAL EVERY 12 HOURS
Status: DISCONTINUED | OUTPATIENT
Start: 2021-01-22 | End: 2021-01-26 | Stop reason: HOSPADM

## 2021-01-22 RX ORDER — ACETAMINOPHEN 325 MG/1
650 TABLET ORAL
Status: DISCONTINUED | OUTPATIENT
Start: 2021-01-22 | End: 2021-01-26 | Stop reason: HOSPADM

## 2021-01-22 RX ORDER — GUAIFENESIN 100 MG/5ML
81 LIQUID (ML) ORAL DAILY
COMMUNITY

## 2021-01-22 RX ADMIN — FLECAINIDE ACETATE 50 MG: 50 TABLET ORAL at 10:26

## 2021-01-22 RX ADMIN — BACLOFEN 5 MG: 10 TABLET ORAL at 21:21

## 2021-01-22 RX ADMIN — FAMOTIDINE 20 MG: 20 TABLET, FILM COATED ORAL at 21:19

## 2021-01-22 RX ADMIN — TRAMADOL HYDROCHLORIDE 50 MG: 50 TABLET, COATED ORAL at 10:24

## 2021-01-22 RX ADMIN — DOCUSATE SODIUM 50 MG AND SENNOSIDES 8.6 MG 1 TABLET: 8.6; 5 TABLET, FILM COATED ORAL at 21:19

## 2021-01-22 RX ADMIN — ACETAMINOPHEN 500 MG: 500 TABLET, FILM COATED ORAL at 21:20

## 2021-01-22 RX ADMIN — BISACODYL 5 MG: 5 TABLET, COATED ORAL at 10:25

## 2021-01-22 RX ADMIN — DOCUSATE SODIUM 100 MG: 100 CAPSULE, LIQUID FILLED ORAL at 21:21

## 2021-01-22 RX ADMIN — ATORVASTATIN CALCIUM 10 MG: 10 TABLET, FILM COATED ORAL at 21:21

## 2021-01-22 RX ADMIN — BUDESONIDE AND FORMOTEROL FUMARATE DIHYDRATE 2 PUFF: 160; 4.5 AEROSOL RESPIRATORY (INHALATION) at 08:34

## 2021-01-22 RX ADMIN — BACLOFEN 5 MG: 10 TABLET ORAL at 18:32

## 2021-01-22 RX ADMIN — LOSARTAN POTASSIUM 50 MG: 50 TABLET, FILM COATED ORAL at 10:34

## 2021-01-22 RX ADMIN — ACETAMINOPHEN 500 MG: 500 TABLET, FILM COATED ORAL at 18:32

## 2021-01-22 RX ADMIN — DILTIAZEM HYDROCHLORIDE 120 MG: 120 CAPSULE, COATED, EXTENDED RELEASE ORAL at 10:26

## 2021-01-22 RX ADMIN — FLECAINIDE ACETATE 50 MG: 50 TABLET ORAL at 21:21

## 2021-01-22 RX ADMIN — AMIODARONE HYDROCHLORIDE 400 MG: 200 TABLET ORAL at 10:24

## 2021-01-22 RX ADMIN — BACLOFEN 5 MG: 10 TABLET ORAL at 10:26

## 2021-01-22 RX ADMIN — ACETAMINOPHEN 500 MG: 500 TABLET, FILM COATED ORAL at 10:26

## 2021-01-22 RX ADMIN — DOCUSATE SODIUM 50 MG AND SENNOSIDES 8.6 MG 1 TABLET: 8.6; 5 TABLET, FILM COATED ORAL at 10:26

## 2021-01-22 RX ADMIN — Medication 10 ML: at 21:22

## 2021-01-22 NOTE — CONSULTS
NEURO CONSULT      REASON FOR ADMISSION:  Reported seizure    HISTORY:  Ms. Eneida Berumen is 77years old with a history of COPD, hypertension, previous stroke, arthritis, who is consulted to neurology for apparently having had a seizure. The patient does not have a history of seizure reportedly. Patient was brought to the ER because of confusion. In the ER, patient had a head CT without contrast that did not show any acute lesion but showed chronic lacunar infarcts as well as some subcortical encephalomalacia. Patient is still in the ER. I did order an MRI of the brain that is showing a) radiata acute infarct. The corpus callosum may have a small lesion.   That is also an infarct                                                ROS:    General:                     No fever, no chills, no sweats, no generalized weakness, no weight loss/gain,                                       No loss of appetite   Eyes:                           No blurred vision, no eye pain, no loss of vision, no double vision  ENT:                            rhinorrhea, no pharyngitis   Respiratory:               No cough, no sputum production, no SOB, no VALDEZ, no wheezing, no pleuritic pain   Cardiology:                No chest pain, no palpitations, no orthopnea, no PND, no edema, no syncope   Gastrointestinal:       No abdominal pain , no N/V, no diarrhea, no dysphagia, no constipation, no bleeding   Genitourinary:           frequency, no urgency, no dysuria, no hematuria, no incontinence   Muskuloskeletal :      No arthralgia, no myalgia, no back pain  Hematology:              No easy bruising, no nose or gum bleeding, no lymphadenopathy   Dermatological:         No rash, no ulceration, no pruritis, no color change / jaundice  Endocrine:                 hot flashes or polydipsia   Neurological:             No headache, no dizziness, no confusion, no focal weakness, no paresthesia,                                      No Speech difficulties, no memory loss, no gait difficulty  Psychological:          No neelings of anxiety, no depression, no agitation      NEURO EXAM:    Mental status: Patient is awake this morning. She is not as confused as she was when she came into the hospital she knows she is in the hospital she is not certain of the day, but knows that it is 2021. She follows one-step commands slowly    Cranial nerves: Patient has a slight decrease in the left nasolabial fold    Motor exam: Patient has mild paresis of the left arm and left leg    Sensory exam: There is no tactile extinction.   Romberg was not    Coordination: Finger-to-nose is better on the right and on the left heel-to-shin is better on the right than the left patient has left Babinski    Gait and Station: Patient was not ambulated    ASSESSMENT:  Acute confusional state likely secondary to an infarct  Right corona radiata infarct including a small portion of the splenium of the corpus callosum    PLAN:  Stroke work-up and treatment  Carotid duplex  2D complete cardiac echo  Plavix 75 mg p.o. tonight  PT/OT consult  Fasting lipid profile in the morning  ENRIQUE  Rehab consult      ALLERGIES:    No Known Allergies    MEDS:      Current Facility-Administered Medications:     acetaminophen (TYLENOL) tablet 650 mg, 650 mg, Oral, Q4H PRN **OR** acetaminophen (TYLENOL) solution 650 mg, 650 mg, Per NG tube, Q4H PRN **OR** acetaminophen (TYLENOL) suppository 650 mg, 650 mg, Rectal, Q4H PRN, Jaswant Ward MD    docusate sodium (COLACE) capsule 100 mg, 100 mg, Oral, BID, Jaswant Tanner MD    famotidine (PEPCID) tablet 20 mg, 20 mg, Oral, Q12H, Jaswant Tanner MD    acetaminophen (TYLENOL) tablet 500 mg, 500 mg, Oral, Q4HWA, Jaswant Tanner MD, 500 mg at 01/22/21 1026    albuterol (PROVENTIL HFA, VENTOLIN HFA, PROAIR HFA) inhaler 2 Puff, 2 Puff, Inhalation, Q4H PRN, Helder CLEMENTE MD    amiodarone (CORDARONE) tablet 400 mg, 400 mg, Oral, DAILY, Jaswant Tanner MD, 400 mg at 01/22/21 1024    atorvastatin (LIPITOR) tablet 10 mg, 10 mg, Oral, QHS, Jaswant Tanner MD, 10 mg at 01/21/21 2149    baclofen (LIORESAL) tablet 5 mg, 5 mg, Oral, TID, Jadyn CLEMENTE MD, 5 mg at 01/22/21 1026    dilTIAZem ER (CARDIZEM CD) capsule 120 mg, 120 mg, Oral, DAILY, Kristie Garcia MD, 120 mg at 01/22/21 1026    flecainide (TAMBOCOR) tablet 50 mg, 50 mg, Oral, BID, Kristie Garcia MD, 50 mg at 01/22/21 1026    budesonide-formoteroL (SYMBICORT) 160-4.5 mcg/actuation HFA inhaler 2 Puff, 2 Puff, Inhalation, BID RT, Kristie Garcia MD, 2 Puff at 01/22/21 0834    losartan (COZAAR) tablet 50 mg, 50 mg, Oral, DAILY, Kristie Garcia MD, 50 mg at 01/22/21 1034    rivaroxaban (XARELTO) tablet 20 mg, 20 mg, Oral, DAILY WITH DINNER, Kristie Garcia MD, 20 mg at 01/21/21 1602    senna-docusate (PERICOLACE) 8.6-50 mg per tablet 1 Tab, 1 Tab, Oral, BID, Kristie Garcia MD, 1 Tab at 01/22/21 1026    sodium chloride (NS) flush 5-40 mL, 5-40 mL, IntraVENous, Q8H, Jaswant Tanner MD, 10 mL at 01/21/21 2200    sodium chloride (NS) flush 5-40 mL, 5-40 mL, IntraVENous, PRN, Jaswant Elliott MD    dextrose 5 % - 0.45% NaCl infusion, 75 mL/hr, IntraVENous, CONTINUOUS, Jaswant Tanner MD, Last Rate: 75 mL/hr at 01/21/21 1127, 75 mL/hr at 01/21/21 1127    acetaminophen (TYLENOL) solution 650 mg, 650 mg, Oral, Q4H PRN, Jadyn CLEMENTE MD    bisacodyL (DULCOLAX) tablet 5 mg, 5 mg, Oral, DAILY PRN, Kristie Garcia MD, 5 mg at 01/22/21 1025    traMADoL (ULTRAM) tablet 50 mg, 50 mg, Oral, BID PRN, Kristie Garcia MD, 50 mg at 01/22/21 1024    Current Outpatient Medications:     oxyCODONE IR (ROXICODONE) 10 mg tab immediate release tablet, Take 10 mg by mouth every six (6) hours as needed for Pain., Disp: , Rfl:     amiodarone (PACERONE) 400 mg tablet, Take 400 mg by mouth daily. , Disp: , Rfl:     baclofen (LIORESAL) 10 mg tablet, Take  by mouth three (3) times daily. , Disp: , Rfl:    triamterene-hydroCHLOROthiazide (DYAZIDE) 37.5-25 mg per capsule, Take  by mouth daily. , Disp: , Rfl:     dilTIAZem ER (Cardizem CD) 120 mg capsule, Take  by mouth daily. , Disp: , Rfl:     rivaroxaban (Xarelto) 20 mg tab tablet, Take 20 mg by mouth daily. , Disp: , Rfl:     acetaminophen (TYLENOL) 500 mg tablet, Take 1 Tab by mouth every four (4) hours (while awake). , Disp: 100 Tab, Rfl: 0    senna-docusate (PERICOLACE) 8.6-50 mg per tablet, Take 1 Tab by mouth two (2) times a day., Disp: 60 Tab, Rfl: 0    oxyCODONE IR (ROXICODONE) 5 mg immediate release tablet, Take 1-2 Tabs by mouth every four (4) hours as needed for Pain. Max Daily Amount: 60 mg., Disp: 100 Tab, Rfl: 0    flecainide (TAMBOCOR) 50 mg tablet, Take 50 mg by mouth two (2) times a day., Disp: , Rfl:     losartan (COZAAR) 50 mg tablet, Take 50 mg by mouth daily. , Disp: , Rfl:     atorvastatin (LIPITOR) 20 mg tablet, Take 10 mg by mouth nightly., Disp: , Rfl:     fluticasone-salmeterol (ADVAIR) 250-50 mcg/dose diskus inhaler, Take 1 Puff by inhalation every twelve (12) hours. , Disp: , Rfl:     albuterol (PROVENTIL HFA, VENTOLIN HFA, PROAIR HFA) 90 mcg/actuation inhaler, Take  by inhalation every four (4) hours as needed for Wheezing., Disp: , Rfl:     OTHER,NON-FORMULARY,, BC ARTHRITIS TAKES PRN, Disp: , Rfl:     LABS:  Recent Results (from the past 24 hour(s))   TROPONIN I    Collection Time: 01/21/21  3:25 PM   Result Value Ref Range    Troponin-I, Qt. <0.05 <0.05 ng/mL       Visit Vitals  BP (!) 162/97   Pulse 86   Temp 98 °F (36.7 °C)   Resp 17   Ht 5' 5\" (1.651 m)   Wt 90.7 kg (200 lb)   SpO2 97%   BMI 33.28 kg/m²       Imaging:  MRI BRAIN WO CONT   Final Result   Impression:   1. Subacute lacunar infarct of the right corona radiata and possible punctate   subacute infarct of the splenium of the corpus callosum.    2.  Generalized volume loss, bifrontal and right parietal encephalomalacia,   chronic bilateral cerebellar infarcts, several chronic microhemorrhages, and   advanced chronic small vessel ischemic changes. 3.  Diminished left vertebral artery flow void which may be related to   developmental hypoplasia versus age-indeterminate occlusion or flow-limiting   stenosis. CT HEAD WO CONT   Final Result   Impression:   1. No evidence of an acute intracranial abnormality. 2.  Multifocal chronic infarcts/encephalomalacia and evidence of chronic small   vessel ischemic changes.       XR CHEST SNGL V   Final Result

## 2021-01-22 NOTE — PROGRESS NOTES
Progress Note    Patient: Taz Dick MRN: [id number]  SSN: [id number]    YOB: 1945  Age: 77 y.o. Sex: female      Admit Date: 1/20/2021    LOS: 2 days     Subjective:     Patient denies any chest pain no shortness of breath denies any fever or chills. MRI of the brain shows subacute infarcts x2 with multiple other chronic lacunar infarcts    Objective:     Vitals:    01/22/21 0400 01/22/21 0500 01/22/21 0647 01/22/21 0834   BP: (!) 160/74 (!) 171/96 (!) 132/108    Pulse: 78 80 79    Resp: 21 17     Temp:       SpO2: 93% 94% 99% 97%   Weight:       Height:            Intake and Output:  Current Shift: No intake/output data recorded. Last three shifts: 01/20 1901 - 01/22 0700  In: -   Out: 150 [Urine:150]    Physical Exam:   General:  Alert, cooperative, no distress, appears stated age. Eyes:  Conjunctivae/corneas clear. PERRL, EOMs intact. Fundi benign   Ears:  Normal TMs and external ear canals both ears. Nose: Nares normal. Septum midline. Mucosa normal. No drainage or sinus tenderness. Mouth/Throat: Lips, mucosa, and tongue normal. Teeth and gums normal.   Neck: Supple, symmetrical, trachea midline, no adenopathy, thyroid: no enlargment/tenderness/nodules, no carotid bruit and no JVD. Back:   Symmetric, no curvature. ROM normal. No CVA tenderness. Lungs:   Clear to auscultation bilaterally. Heart:  Regular rate and rhythm, S1, S2 normal, no murmur, click, rub or gallop. Abdomen:   Soft, non-tender. Bowel sounds normal. No masses,  No organomegaly. Extremities: Extremities normal, atraumatic, no cyanosis or edema. Pulses: 2+ and symmetric all extremities. Skin: Skin color, texture, turgor normal. No rashes or lesions   Lymph nodes: Cervical, supraclavicular, and axillary nodes normal.   Neurologic: CNII-XII intact. Normal strength, sensation and reflexes throughout.      Recent Results (from the past 24 hour(s))   TROPONIN I    Collection Time: 01/21/21  3:25 PM Result Value Ref Range    Troponin-I, Qt. <0.05 <0.05 ng/mL         Lab/Data Review:      Recent Results (from the past 24 hour(s))   TROPONIN I    Collection Time: 01/21/21  3:25 PM   Result Value Ref Range    Troponin-I, Qt. <0.05 <0.05 ng/mL         Assessment:     Active Problems:    AMS (altered mental status) (1/20/2021)      Encephalopathy (1/21/2021)      Acute/subacute new lacunar infarcts neurology following  Hypertension continue to monitor resume home medications  Encephalopathy slight improvement  Doubt seizure obtain EEG  Obesity  Plan:     Continue with present management.   Neurology evaluation pending    Signed By: Kerwin Servin MD     January 22, 2021

## 2021-01-22 NOTE — PROGRESS NOTES
SPEECH LANGUAGE PATHOLOGY BEDSIDE SWALLOW EVALUATIONS  Patient: Jazzmine Bartholomew (89 y.o. female)  Date: 1/22/2021  Primary Diagnosis: AMS (altered mental status) [R41.82]  Encephalopathy [G93.40]  Stroke (cerebrum) ([facility] [address] [I63.9]        Precautions: aspiration      ASSESSMENT :  Review of MRI results -subacute infarcts x2, multiple chronic lacunar infarcts. Granddaughter arrived during evaluation and reported some confusion x1 week. Based on the objective data described below, the patient does not present w/ a significant dysphagia at this time. She is edentulous and her partials are at home, however she reports she does not eat with her partials. Patient ingested thin liquids via cup and straw. Swallow initiation timely, HLE and protraction adequate to palpation. No overt s/sx of aspiration noted w/ all thin liquid trials. Administered hard solid cracker. Mastication prolonged. No oral residual or pharyngeal difficulty noted. Some confusion noted during conversation. Significant aphasia or dysarthria noted. Patient will benefit from skilled intervention to address the above impairments. Patients rehabilitation potential is considered to be Good     PLAN :  Recommendations and Planned Interventions:  Chopped diet and thin liquids. Administer medications 1-2 at a time in applesauce. Full cognitive-linguistic evaluation as indicated. Frequency/Duration: Patient will be followed by speech-language pathology 3 times a week to address goals. Discharge Recommendations: To Be Determined     SUBJECTIVE:   Patient seen at bedside in the ED. She is alert. She is requesting help to call her granddaughter. Patient reports no changes in speech and swallowing, however she did say she had some difficulty swallowing her pills this am.     OBJECTIVE:     CXR Results  (Last 48 hours)                 01/20/21 1756  XR CHEST SNGL V Final result    Narrative: Indication: Weakness.        AP portable upright chest radiograph 20 January 2021 1730 hours. No comparison. Underexpanded lungs. Heterogeneous bilateral airspace opacities predominating in the perihilar areas   and left lung base. These are relatively linear, more likely atelectasis than   pneumonia although pneumonia is not excluded. Mild cardiomegaly. No pneumothorax or pleural effusion. CT Results  (Last 48 hours)                 01/20/21 1835  CT HEAD WO CONT Final result    Impression:  Impression:   1. No evidence of an acute intracranial abnormality. 2.  Multifocal chronic infarcts/encephalomalacia and evidence of chronic small   vessel ischemic changes. Narrative:  Study: Head CT without contrast.       Clinical Indication: Altered mental status. Comparison: None available. Technique: Routine volume acquisition of the head was performed without contrast   using soft tissue and bone kernels. Coronal and sagittal reconstructions were   generated and reviewed. Dose reduction: All CT scans at this facility are   performed using dose reduction optimization techniques as appropriate to a   performed exam including the following-automated exposure control, adjustments   of mA and/or Kv according to patient size, or use of iterative reconstructive   technique. Findings:       Generalized volume loss and regions of bifrontal and right parietal lobe   encephalomalacia with commensurate prominence of the ventricles and sulci. No midline shift. The basal cisterns are patent. Chronic bilateral cerebellar   infarcts. No acute hemorrhage, abnormal extra-axial fluid, or evidence of large   territorial ischemia. Scattered foci of hypodensity involving the cerebral   hemispheric white matter are nonspecific but most commonly associated with   chronic small vessel ischemic changes. Dense intracranial vascular   calcifications. Absent native lenses.  The included paranasal sinuses and mastoid air cells are clear. No evidence of an aggressive calvarial or extracalvarial lesion. Past Medical History:   Diagnosis Date    Arthritis     Chronic obstructive pulmonary disease ([facility] [address]     Hypertension     Stroke [facility]) 2011 2012    NO RESIDUAL     Past Surgical History:   Procedure Laterality Date    HX ORTHOPAEDIC Left 2012     Prior Level of Function/Home Situation: unknown     Diet prior to admission: regular, thin  Current Diet:  regular, thin   Cognitive and Communication Status:  Neurologic State: Alert  Orientation Level: Oriented to person  Cognition: Follows commands           Swallowing Evaluation:   Oral Assessment:  Oral Assessment  Labial: No impairment  Dentition: Limited;Partials (comment)  Lingual: No impairment  Mandible: No impairment  P.O. Trials:  Patient Position: upright in bed  Vocal quality prior to P.O.: No impairment  Consistency Presented: Thin liquid; Solid  How Presented: SLP-fed/presented;Self-fed/presented;Straw;Cup/sip     Bolus Acceptance: No impairment  Bolus Formation/Control: No impairment     Propulsion: Delayed (# of seconds)  Oral Residue: None  Initiation of Swallow: No impairment  Laryngeal Elevation: Functional  Aspiration Signs/Symptoms: None  Pharyngeal Phase Characteristics: No impairment, issues, or problems        Oral Phase Severity: No impairment  Pharyngeal Phase Severity : No impairment  Voice:     Vocal Quality: No impairment          After treatment:   Patient left in no apparent distress in bed    COMMUNICATION/EDUCATION:   Patient was educated regarding purpose of SLP assessment, POC, diet recs and sw safety precautions. Patient demonstrated Good understanding as evidenced by verbal understanding but needs reinforcement. The patient's plan of care including recommendations, planned interventions, and recommended diet changes were discussed with: Registered nurse.      Patient/family have participated as able in goal setting and plan of care.    Thank you for this referral.  Puja Sood M.S. CCC-SLP  Time Calculation: 15 mins           Problem: Dysphagia (Adult)  Goal: *Acute Goals and Plan of Care (Insert Text)  Description: Speech Therapy Swallow Goals  Initiated 1/22/2021  -Patient will tolerate chopped diet with thin liquids without clinical indicators of aspiration given no cues within 5-7 day(s). [ ] Not met  [ ]  MET   [ ] Progressing  [ ] Randi Merida  -Patient will tolerate PO trials without clinical indicators of aspiration given no cues within 5-7 day(s). [ ] Not met  [ ]  MET   [ ] Progressing  [ ] Randi Merida  -Patient will participate in modified barium swallow study within 5-7 day(s). [ ] Not met  [ ]  MET   [ ] Progressing  [ ] Randi Merida  -Patient will demonstrate understanding of swallow safety precautions and aspiration precautions, diet recs with no cues within 5-7 day(s). [ ] Not met  [ ]  MET   [ ] Progressing  [ ] Discontinue  -Complete cognitive-linguistic evaluation as warranted.               [ ] Not met  [ ]  MET   [ ] Progressing  [ ] Discontinue     Outcome: Not Progressing Towards Goal

## 2021-01-22 NOTE — PROGRESS NOTES
Consult received and completed for bedside swallow evaluation. Recommend advanced soft ( chopped ), thin liquids. Administer pills 1-2 at a time in applesauce. Confusion noted. No significant aphasia or dysarthria noted at this time. Full report in chart to follow. Thank you for this consult.

## 2021-01-22 NOTE — ROUTINE PROCESS
TRANSFER - OUT REPORT: 
 
Verbal report given to Anjali Swain RN (name) on Maria L Ray  being transferred to 42(unit) for routine progression of care Report consisted of patients Situation, Background, Assessment and  
Recommendations(SBAR). Information from the following report(s) SBAR was reviewed with the receiving nurse. Lines:  
Peripheral IV 01/20/21 Right; Lower Forearm (Active) Site Assessment Clean, dry, & intact 01/20/21 1807 Phlebitis Assessment 0 01/20/21 1807 Infiltration Assessment 0 01/20/21 1807 Dressing Status Clean, dry, & intact 01/20/21 1807 Dressing Type Tape;Transparent 01/20/21 1807 Hub Color/Line Status Pink;Patent; Flushed 01/20/21 1807 Action Taken Blood drawn 01/20/21 1807 Opportunity for questions and clarification was provided. Patient transported with: 
 [place]

## 2021-01-23 PROCEDURE — 97165 OT EVAL LOW COMPLEX 30 MIN: CPT

## 2021-01-23 PROCEDURE — 97530 THERAPEUTIC ACTIVITIES: CPT

## 2021-01-23 PROCEDURE — 80061 LIPID PANEL: CPT

## 2021-01-23 PROCEDURE — 36415 COLL VENOUS BLD VENIPUNCTURE: CPT

## 2021-01-23 PROCEDURE — 94640 AIRWAY INHALATION TREATMENT: CPT

## 2021-01-23 PROCEDURE — 97116 GAIT TRAINING THERAPY: CPT | Performed by: PHYSICAL THERAPIST

## 2021-01-23 PROCEDURE — 97161 PT EVAL LOW COMPLEX 20 MIN: CPT | Performed by: PHYSICAL THERAPIST

## 2021-01-23 PROCEDURE — 74011250637 HC RX REV CODE- 250/637: Performed by: INTERNAL MEDICINE

## 2021-01-23 PROCEDURE — 97530 THERAPEUTIC ACTIVITIES: CPT | Performed by: PHYSICAL THERAPIST

## 2021-01-23 PROCEDURE — 83036 HEMOGLOBIN GLYCOSYLATED A1C: CPT

## 2021-01-23 PROCEDURE — 65270000029 HC RM PRIVATE

## 2021-01-23 PROCEDURE — 74011000258 HC RX REV CODE- 258: Performed by: INTERNAL MEDICINE

## 2021-01-23 RX ADMIN — DOCUSATE SODIUM 50 MG AND SENNOSIDES 8.6 MG 1 TABLET: 8.6; 5 TABLET, FILM COATED ORAL at 08:52

## 2021-01-23 RX ADMIN — LOSARTAN POTASSIUM 50 MG: 50 TABLET, FILM COATED ORAL at 08:53

## 2021-01-23 RX ADMIN — ACETAMINOPHEN 500 MG: 500 TABLET, FILM COATED ORAL at 14:09

## 2021-01-23 RX ADMIN — AMIODARONE HYDROCHLORIDE 400 MG: 200 TABLET ORAL at 08:53

## 2021-01-23 RX ADMIN — Medication 10 ML: at 14:09

## 2021-01-23 RX ADMIN — FLECAINIDE ACETATE 50 MG: 50 TABLET ORAL at 08:52

## 2021-01-23 RX ADMIN — ACETAMINOPHEN 500 MG: 500 TABLET, FILM COATED ORAL at 21:24

## 2021-01-23 RX ADMIN — ACETAMINOPHEN 500 MG: 500 TABLET, FILM COATED ORAL at 17:24

## 2021-01-23 RX ADMIN — BACLOFEN 5 MG: 10 TABLET ORAL at 16:22

## 2021-01-23 RX ADMIN — FLECAINIDE ACETATE 50 MG: 50 TABLET ORAL at 21:25

## 2021-01-23 RX ADMIN — ACETAMINOPHEN 500 MG: 500 TABLET, FILM COATED ORAL at 09:57

## 2021-01-23 RX ADMIN — DEXTROSE AND SODIUM CHLORIDE 75 ML/HR: 5; 450 INJECTION, SOLUTION INTRAVENOUS at 06:07

## 2021-01-23 RX ADMIN — BUDESONIDE AND FORMOTEROL FUMARATE DIHYDRATE 2 PUFF: 160; 4.5 AEROSOL RESPIRATORY (INHALATION) at 09:55

## 2021-01-23 RX ADMIN — DOCUSATE SODIUM 100 MG: 100 CAPSULE, LIQUID FILLED ORAL at 21:25

## 2021-01-23 RX ADMIN — ACETAMINOPHEN 500 MG: 500 TABLET, FILM COATED ORAL at 06:07

## 2021-01-23 RX ADMIN — FAMOTIDINE 20 MG: 20 TABLET, FILM COATED ORAL at 08:52

## 2021-01-23 RX ADMIN — DOCUSATE SODIUM 100 MG: 100 CAPSULE, LIQUID FILLED ORAL at 08:52

## 2021-01-23 RX ADMIN — Medication 10 ML: at 06:09

## 2021-01-23 RX ADMIN — ATORVASTATIN CALCIUM 10 MG: 10 TABLET, FILM COATED ORAL at 22:00

## 2021-01-23 RX ADMIN — BACLOFEN 5 MG: 10 TABLET ORAL at 08:53

## 2021-01-23 RX ADMIN — BACLOFEN 5 MG: 10 TABLET ORAL at 21:25

## 2021-01-23 RX ADMIN — DILTIAZEM HYDROCHLORIDE 120 MG: 120 CAPSULE, COATED, EXTENDED RELEASE ORAL at 08:54

## 2021-01-23 RX ADMIN — Medication 10 ML: at 21:36

## 2021-01-23 RX ADMIN — DOCUSATE SODIUM 50 MG AND SENNOSIDES 8.6 MG 1 TABLET: 8.6; 5 TABLET, FILM COATED ORAL at 21:25

## 2021-01-23 RX ADMIN — RIVAROXABAN 20 MG: 10 TABLET, FILM COATED ORAL at 16:22

## 2021-01-23 RX ADMIN — BUDESONIDE AND FORMOTEROL FUMARATE DIHYDRATE 2 PUFF: 160; 4.5 AEROSOL RESPIRATORY (INHALATION) at 20:51

## 2021-01-23 RX ADMIN — FAMOTIDINE 20 MG: 20 TABLET, FILM COATED ORAL at 21:24

## 2021-01-23 NOTE — PROGRESS NOTES
Problem: Mobility Impaired (Adult and Pediatric)  Goal: *Acute Goals and Plan of Care (Insert Text)  Description: Pt will be I with LE HEP in 7 days. Pt will perform supine<>sit with Min A x1 in 7 days. Pt will be bruno to sit EOB for 10 minutes at SBA in 7 days. Pt will perform transfers with Min A x1 in 7 days. Pt will amb 50 feet with LRAD safely with CGA in 7 days. Outcome: Not Met     Problem: Patient Education: Go to Patient Education Activity  Goal: Patient/Family Education  Outcome: Not Met     PHYSICAL THERAPY EVALUATION  Patient: Cory Mendez (23 y.o. female)  Date: 1/23/2021  Primary Diagnosis: AMS (altered mental status) [R41.82]  Encephalopathy [G93.40]  Stroke (cerebrum) ([facility] [address] [I63.9]        Precautions:  Fall(CVA)    ASSESSMENT  77 yof who came to the hospital on 1/20/21 due to confusion. MRI of the brain shows subacute infarcts x2 with multiple other chronic lacunar infarcts  PMHx: COPD, hypertension, CVA, arthritis    Pt lives with daughter in [address] [place] [address] with 1 AL without railing. She gets assistance from daughter to negotiate step. Was ind with dressing and had assistance with dressing. Reports using SPC at home at supervision. Pt also has FWW at home. Pt is alone during the day while daughter is at work. PT/OT eval completed together for safety reasons. Pt A&O x4. When PT/OT entered pt room, pt reported that she needed to use the bathroom, Supine to sit at Mod A x2, Sitting EOB at [facility] with one LOB to the R towards end of treatment, Sit to stand from bed and commode at Mod A x2, ambulation of 5 feet at Min A x2 for AD management, and sit to supine Max A x2. Pt frequently talks off off topic and requires Max VCs to stay on task, and had decreased safety awareness. Pt demos poor safety awareness, decreased strength, and and reduced balance. She would benefit from PT in the hospital to address her limitations. PT rec DC to IRF at this time.       Patient will benefit from skilled therapy intervention to address the above noted impairments. PLAN :  Recommendations and Planned Interventions: bed mobility training, transfer training, gait training, therapeutic exercises, and therapeutic activities      Frequency/Duration: Patient will be followed by physical therapy:  5 times a week to address goals. Recommendation for discharge: (in order for the patient to meet his/her long term goals)  IRF    This discharge recommendation:  Has not yet been discussed the attending provider and/or case management    IF patient discharges home will need the following DME: to be determined (TBD) at next level of care         SUBJECTIVE:   Patient stated Chau Argueta got tested the other day for COVID, I'm ok.     OBJECTIVE DATA SUMMARY:   HISTORY:    Past Medical History:   Diagnosis Date    Arthritis     Chronic obstructive pulmonary disease ([facility] [address]     Chronic pain     Hypertension     Morbid obesity ([facility] [address]     Stroke [facility]) 2011 2012    NO RESIDUAL     Past Surgical History:   Procedure Laterality Date    HX ORTHOPAEDIC Left 2012       Personal factors and/or comorbidities impacting plan of care: see pt chart    Home Situation  Home Environment: Private residence  # Steps to Enter: 1  Rails to Enter: No  One/Two Story Residence: One story  Living Alone: No  Support Systems: Child(nura)  Patient Expects to be Discharged to[de-identified] Rehabilitation facility  Current DME Used/Available at Home: Walker, rolling, Cane, straight    PLOF: Pt supervision for ADLS/IADLS, supervision with mobility prior to admission.      EXAMINATION/PRESENTATION/DECISION MAKING:   Critical Behavior:  Neurologic State: Alert  Orientation Level: Oriented X4  Cognition: Follows commands, Decreased attention/concentration, Poor safety awareness  Safety/Judgement: Decreased insight into deficits    Range Of Motion:     PROM: Generally decreased, functional           Strength:    Strength: Generally decreased, functional            Functional Mobility:  Bed Mobility:     Supine to Sit: Moderate assistance;Assist x2  Sit to Supine: Maximum assistance;Assist x2     Transfers:  Sit to Stand: Moderate assistance;Assist x2  Stand to Sit: Moderate assistance;Assist x2                       Balance:   Sitting: Impaired  Sitting - Static: Poor (constant support)  Sitting - Dynamic: Poor (constant support)  Standing: Impaired  Standing - Static: Fair  Standing - Dynamic : Poor  Ambulation/Gait Training:              Gait Description (WDL): Exceptions to WDL                Functional Measure:    [facility] AM-PAC 6 Clicks         Basic Mobility Inpatient Short Form  How much difficulty does the patient currently have. .. Unable A Lot A Little None   1. Turning over in bed (including adjusting bedclothes, sheets and blankets)? [] 1   [x] 2   [] 3   [] 4   2. Sitting down on and standing up from a chair with arms ( e.g., wheelchair, bedside commode, etc.)   [] 1   [x] 2   [] 3   [] 4   3. Moving from lying on back to sitting on the side of the bed? [] 1   [x] 2   [] 3   [] 4          How much help from another person does the patient currently need. .. Total A Lot A Little None   4. Moving to and from a bed to a chair (including a wheelchair)? [] 1   [x] 2   [] 3   [] 4   5. Need to walk in hospital room? [] 1   [x] 2   [] 3   [] 4   6. Climbing 3-5 steps with a railing? [x] 1   [] 2   [] 3   [] 4   © 2007, Trustees of [facility], under license to [employer]. All rights reserved     Score:  Initial: MS Most Recent: X (Date: -- )   Interpretation of Tool:  Represents activities that are increasingly more difficult (i.e. Bed mobility, Transfers, Gait).   Score 24 23 22-20 19-15 14-10 9-7 6   Modifier CH CI CJ CK CL CM CN          Physical Therapy Evaluation Charge Determination   History Examination Presentation Decision-Making   LOW Complexity : Zero comorbidities / personal factors that will impact the outcome / POC LOW Complexity : 1-2 Standardized tests and measures addressing body structure, function, activity limitation and / or participation in recreation  LOW Complexity : Stable, uncomplicated  Other Functional Measure [facility] 6 11      Based on the above components, the patient evaluation is determined to be of the following complexity level: LOW     Pain Ratin/10    Activity Tolerance:   Poor  Please refer to the flowsheet for vital signs taken during this treatment. After treatment patient left in no apparent distress:   Supine in bed, Call bell within reach, and Bed / chair alarm activated    COMMUNICATION/EDUCATION:   The patients plan of care was discussed with: Occupational therapist.     Patient/family agree to work toward stated goals and plan of care.     Thank you for this referral.  Traci Gomez   Time Calculation: 45 mins 20.2

## 2021-01-23 NOTE — PROGRESS NOTES
Problem: Self Care Deficits Care Plan (Adult)  Goal: *Acute Goals and Plan of Care (Insert Text)  Description: Pt will be mod I sup<->sit in prep for EOB ADL's  Pt will be SUP LB dressing EOB level  Pt will be mod I sit EOB 10 minutes in prep for EOB ADL's  Pt will be mod I grooming EOB level  Pt will be min A x 1 sit<-> prep for toilet transfer  Pt will be SUP BSC/toilet transfer with LRAD  Pt will be mod I toileting/cloth mgmt LRAD  Pt will be CGA x 1 grooming standing sink  Pt will be MI gabriella UE HEP in prep for self care tasks  Outcome: Not Met     Problem: Patient Education: Go to Patient Education Activity  Goal: Patient/Family Education  Outcome: Not Met     OCCUPATIONAL THERAPY EVALUATION  Patient: Erendira Recio (15 y.o. female)  Date: 1/23/2021  Primary Diagnosis: AMS (altered mental status) [R41.82]  Encephalopathy [G93.40]  Stroke (cerebrum) ([facility]) [I63.9]        Precautions: Fall(CVA)    ASSESSMENT  Pt is a 76 yo female with a PMH of CPOD, HTN, CVA, arthritis, who presented to [facility] 1[facility]21 due to confusion, admitted for AMS and CVA. MRI showed gabriella frontal lobe infarcts and chronic lacunar infarcts. Pt received in room semi-supine in bed, requested to use the bathroom for BM. Pt A&Ox4 and agreeable to OT/PT eval. Pt transferred semi-supine>EOB with mod A x 2, then sit>stand to RW with mod A x 2. Pt completed bathroom mobility with min A x 2 for safety, transferred to toilet with min A and max verbal cues for hand placement from RW to grab bar. Completed toileting with sup/CGA, pt retrieved and completed hygiene without physical assist. Pt CGA for sit>stand from toilet when using grab bar. Mobility is slow and required cues for safety with RW. Pt's sitting balance declined with activity, from SBA->CGA/min A particularly towards the right side. Pt's BUE strength generally decreased, with LUE shoulder flexion 3-/5 and elbow flexion/extension 3+ to 4-/5. Pt often required max verbal/tactile cues to reattend to task or commands. Generally decreased safety awareness and benefits from 2 person for safety. Required max A to scoot up towards HOB, max A x 2 to return to semi-supine position. Pt reported she lives with her daughter in a one-story home with 1 AL, no railings. She would have hand held assist from her daughter to navigate stairs. Pt reported she was IND with her ADLs except bathing, her daughter or grandchildren would help bathe her in the tub, reported there is a bath chair for her to use. Pt has a commode chair, cane, and walker. Primarily uses the cane for functional mobility. Pt is alone during the day while her daughter works. Pt presents with decreased strength/ROM in LUE and LLE, decreased safety awareness, decreased sitting balance, decreased standing balance/tolerance, and is a fall risk. Pt would benefit from skilled acute OT to address these deficits and improve her IND and safety in ADLs. Recommend pt discharge to IRF when medically appropriate. Current Level of Function Impacting Discharge (ADLs/self-care): min A x 2 for bathroom mobility, min A toilet transfer, total A footwear    Functional Outcome Measure: The patient scored 15/24 on the AM-PAC outcome measure which is indicative of 40-59%. Other factors to consider for discharge: supervision for day/night     PLAN :  Recommendations and Planned Interventions: self care training, functional mobility training, therapeutic exercise, balance training, therapeutic activities, endurance activities, neuromuscular re-education and patient education    Frequency/Duration: Patient will be followed by occupational therapy 5 times a week to address goals.     Recommendation for discharge: (in order for the patient to meet his/her long term goals)  Recommend discharge to IRF for  Therapy 3 hours per day 5-7 days per week    This discharge recommendation:  Has not yet been discussed the attending provider and/or case management    IF patient discharges home will need the following DME: AE: long handled dressing       SUBJECTIVE:   Patient stated I usually tend to lean towards the right side.     OBJECTIVE DATA SUMMARY:   HISTORY:   Past Medical History:   Diagnosis Date    Arthritis     [facility] pulmonary disease ([facility] [address]     Chronic pain     Hypertension     Morbid obesity ([facility] [address]     Stroke [facility]) 2011 2012    NO RESIDUAL     Past Surgical History:   Procedure Laterality Date    HX ORTHOPAEDIC Left 2012       Expanded or extensive additional review of patient history:     Home Situation  Home Environment: Private residence  # Steps to Enter: 1  Rails to Enter: No  One/Two Story Residence: One story  Living Alone: No  Support Systems: Child(nura)  Patient Expects to be Discharged to[de-identified] Rehabilitation facility  Current DME Used/Available at Home: Walker, rolling, Cane, straight  Tub or Shower Type: Tub/Shower combination    Hand dominance: Right    EXAMINATION OF PERFORMANCE DEFICITS:  Cognitive/Behavioral Status:  Neurologic State: Alert  Orientation Level: Oriented X4  Cognition: Follows commands     Perseveration: Perseverates during ADLS;Perseverates during conversation;Verbal cues provided; Tactile cues provided  Safety/Judgement: Decreased insight into deficits    Range of Motion:    AROM: Generally decreased, functional  PROM: Generally decreased, functional     Strength:    Strength: Generally decreased, functional     Coordination:  Coordination: Generally decreased, functional  Fine Motor Skills-Upper: Left Impaired;Right Intact       Balance:  Sitting: Impaired  Sitting - Static: Poor (constant support)  Sitting - Dynamic: Poor (constant support)  Standing: Impaired  Standing - Static: Fair  Standing - Dynamic : Poor    Functional Mobility and Transfers for ADLs:  Bed Mobility:  Supine to Sit: Moderate assistance;Assist x2  Sit to Supine: Maximum assistance;Assist x2  Scooting: Maximum assistance    Transfers:  Sit to Stand: Moderate assistance;Assist x2  Stand to Sit: Moderate assistance;Assist x2  Bathroom Mobility: Minimum assistance(x 2)  Toilet Transfer : Minimum assistance;Assist x1    ADL Assessment:    Lower Body Dressing: Total assistance    Toileting: Supervision;Contact guard assistance    ADL Intervention and task modifications:    Lower Body Dressing Assistance  Dressing Assistance: Total assistance(dependent)  Socks: Total assistance (dependent)  Position Performed: Seated edge of bed  Cues: Physical assistance;Verbal cues provided    Toileting  Toileting Assistance: Supervision;Contact guard assistance  Bowel Hygiene: Supervision;Contact guard assistance  Cues: Verbal cues provided    Cognitive Retraining  Safety/Judgement: Decreased insight into deficits    Functional Measure:    [address] [place] [address] AM-PACTM \"6 Clicks\"                                                       Daily Activity Inpatient Short Form  How much help from another person does the patient currently need. .. Total; A Lot A Little None   1. Putting on and taking off regular lower body clothing? [x]  1 []  2 []  3 []  4   2. Bathing (including washing, rinsing, drying)? [x]  1 []  2 []  3 []  4   3. Toileting, which includes using toilet, bedpan or urinal? [] 1 []  2 [x]  3 []  4   4. Putting on and taking off regular upper body clothing? []  1 []  2 [x]  3 []  4   5. Taking care of personal grooming such as brushing teeth? []  1 []  2 [x]  3 []  4   6. Eating meals? []  1 []  2 []  3 [x]  4   © 2007, Trustees of [address] [place], [place] [id number], under license to [employer]. All rights reserved     Score: 15/24     Interpretation of Tool:  Represents clinically-significant functional categories (i.e. Activities of daily living).   Percentage of Impairment CH    0%   CI    1-19% CJ    20-39% CK    40-59% CL    60-79% CM    80-99% CN     100%   [facility]  Score 6-24 24 23 20-22 15-19 10-14 7-9 6       Occupational Therapy Evaluation Charge Determination   History Examination Decision-Making   MEDIUM Complexity : Expanded review of history including physical, cognitive and psychosocial  history  MEDIUM Complexity : 3-5 performance deficits relating to physical, cognitive , or psychosocial skils that result in activity limitations and / or participation restrictions MEDIUM Complexity : Patient may present with comorbidities that affect occupational performnce. Miniml to moderate modification of tasks or assistance (eg, physical or verbal ) with assesment(s) is necessary to enable patient to complete evaluation       Based on the above components, the patient evaluation is determined to be of the following complexity level: MEDIUM  Pain Ratin/10    Activity Tolerance:   Fair and requires rest breaks  Please refer to the flowsheet for vital signs taken during this treatment. After treatment patient left in no apparent distress:    Supine in bed, Call bell within reach and Bed / chair alarm activated    COMMUNICATION/EDUCATION:   The patients plan of care was discussed with: Physical therapist and Registered nurse. Patient/family agree to work toward stated goals and plan of care. This patients plan of care is appropriate for delegation to [facility].     Thank you for this referral.  Angela Rogers OT  Time Calculation: 45 mins

## 2021-01-23 NOTE — PROGRESS NOTES
SW met with patient and granddaughter at bedside to complete a DCP. Patient lives with her daughter in a home with 2 steps to enter. Patient has help with bathing but is independent with all other ADL's. Patient has a cane, no hx of HH, or SNF. Patients PCP is Dr. Eric Ang. Patients granddaughter Mouna Cazares) was visably upset, stating that her grandmother was sitting in feces for any hr and when she asked a nurse to take out the garbage she got an unfavorable response. Granddaughter also stated that she had to bathe her grandmother and she has been in the hospital for four days. She would like to speak to someone on Monday. Patient will be transported by her daughter if discharged home. SW will need to follow up.

## 2021-01-23 NOTE — PROGRESS NOTES
Primary Nurse Alton Mcdonough LPN and Victorina Mendez LPN performed a dual skin assessment on this patient , skin is dry and intact. No wounds noted.

## 2021-01-23 NOTE — PROGRESS NOTES
shift change report given to Moriah Delgado Dr. (oncoming nurse) by Avel Bales LPN (offgoing nurse).  Report included the following information SBAR and MAR

## 2021-01-24 LAB
CHOLEST SERPL-MCNC: 168 MG/DL
EST. AVERAGE GLUCOSE BLD GHB EST-MCNC: 120 MG/DL
HBA1C MFR BLD: 5.8 % (ref 4–5.6)
HDLC SERPL-MCNC: 79 MG/DL
HDLC SERPL: 2.1 {RATIO} (ref 0–5)
LDLC SERPL CALC-MCNC: 78.2 MG/DL (ref 0–100)
LIPID PROFILE,FLP: NORMAL
TRIGL SERPL-MCNC: 54 MG/DL (ref ?–150)
VLDLC SERPL CALC-MCNC: 10.8 MG/DL

## 2021-01-24 PROCEDURE — 74011000258 HC RX REV CODE- 258: Performed by: INTERNAL MEDICINE

## 2021-01-24 PROCEDURE — 94640 AIRWAY INHALATION TREATMENT: CPT

## 2021-01-24 PROCEDURE — 97116 GAIT TRAINING THERAPY: CPT | Performed by: PHYSICAL THERAPIST

## 2021-01-24 PROCEDURE — 74011250637 HC RX REV CODE- 250/637: Performed by: INTERNAL MEDICINE

## 2021-01-24 PROCEDURE — 65270000029 HC RM PRIVATE

## 2021-01-24 RX ADMIN — BACLOFEN 5 MG: 10 TABLET ORAL at 16:21

## 2021-01-24 RX ADMIN — Medication 10 ML: at 05:38

## 2021-01-24 RX ADMIN — Medication 10 ML: at 13:54

## 2021-01-24 RX ADMIN — BUDESONIDE AND FORMOTEROL FUMARATE DIHYDRATE 2 PUFF: 160; 4.5 AEROSOL RESPIRATORY (INHALATION) at 08:49

## 2021-01-24 RX ADMIN — Medication 10 ML: at 22:37

## 2021-01-24 RX ADMIN — BACLOFEN 5 MG: 10 TABLET ORAL at 08:35

## 2021-01-24 RX ADMIN — DOCUSATE SODIUM 50 MG AND SENNOSIDES 8.6 MG 1 TABLET: 8.6; 5 TABLET, FILM COATED ORAL at 08:34

## 2021-01-24 RX ADMIN — ACETAMINOPHEN 500 MG: 500 TABLET, FILM COATED ORAL at 22:36

## 2021-01-24 RX ADMIN — DOCUSATE SODIUM 100 MG: 100 CAPSULE, LIQUID FILLED ORAL at 08:35

## 2021-01-24 RX ADMIN — DEXTROSE AND SODIUM CHLORIDE 75 ML/HR: 5; 450 INJECTION, SOLUTION INTRAVENOUS at 02:22

## 2021-01-24 RX ADMIN — FLECAINIDE ACETATE 50 MG: 50 TABLET ORAL at 08:35

## 2021-01-24 RX ADMIN — BUDESONIDE AND FORMOTEROL FUMARATE DIHYDRATE 2 PUFF: 160; 4.5 AEROSOL RESPIRATORY (INHALATION) at 20:35

## 2021-01-24 RX ADMIN — DEXTROSE AND SODIUM CHLORIDE 75 ML/HR: 5; 450 INJECTION, SOLUTION INTRAVENOUS at 17:45

## 2021-01-24 RX ADMIN — FAMOTIDINE 20 MG: 20 TABLET, FILM COATED ORAL at 22:35

## 2021-01-24 RX ADMIN — ACETAMINOPHEN 500 MG: 500 TABLET, FILM COATED ORAL at 17:45

## 2021-01-24 RX ADMIN — RIVAROXABAN 20 MG: 10 TABLET, FILM COATED ORAL at 16:21

## 2021-01-24 RX ADMIN — LOSARTAN POTASSIUM 50 MG: 50 TABLET, FILM COATED ORAL at 08:35

## 2021-01-24 RX ADMIN — BACLOFEN 5 MG: 10 TABLET ORAL at 22:35

## 2021-01-24 RX ADMIN — FLECAINIDE ACETATE 50 MG: 50 TABLET ORAL at 22:36

## 2021-01-24 RX ADMIN — ACETAMINOPHEN 500 MG: 500 TABLET, FILM COATED ORAL at 05:38

## 2021-01-24 RX ADMIN — DILTIAZEM HYDROCHLORIDE 120 MG: 120 CAPSULE, COATED, EXTENDED RELEASE ORAL at 08:34

## 2021-01-24 RX ADMIN — AMIODARONE HYDROCHLORIDE 400 MG: 200 TABLET ORAL at 08:35

## 2021-01-24 RX ADMIN — ACETAMINOPHEN 500 MG: 500 TABLET, FILM COATED ORAL at 13:54

## 2021-01-24 RX ADMIN — ACETAMINOPHEN 500 MG: 500 TABLET, FILM COATED ORAL at 09:59

## 2021-01-24 RX ADMIN — FAMOTIDINE 20 MG: 20 TABLET, FILM COATED ORAL at 08:35

## 2021-01-24 RX ADMIN — ATORVASTATIN CALCIUM 10 MG: 10 TABLET, FILM COATED ORAL at 22:36

## 2021-01-24 NOTE — PROGRESS NOTES
Progress Note    Patient: Robert Campos MRN: [id number]  SSN: [id number]    YOB: 1945  Age: 77 y.o. Sex: female      Admit Date: 1/20/2021    LOS: 3 days     Subjective:     Patient denies any chest pain no shortness of breath denies any fever or chills. MRI of the brain shows subacute infarcts x2 with multiple other chronic lacunar infarcts  For PT and OT for placement  Objective:     Vitals:    01/23/21 0800 01/23/21 0852 01/23/21 1200 01/23/21 1600   BP:  (!) 153/95  (!) 156/78   Pulse: 98 75 98 92   Resp:    20   Temp:    98.6 °F (37 °C)   SpO2:    99%   Weight:       Height:            Intake and Output:  Current Shift: No intake/output data recorded. Last three shifts: No intake/output data recorded. Physical Exam:   General:  Alert, cooperative, no distress, appears stated age. Eyes:  Conjunctivae/corneas clear. PERRL, EOMs intact. Fundi benign   Ears:  Normal TMs and external ear canals both ears. Nose: Nares normal. Septum midline. Mucosa normal. No drainage or sinus tenderness. Mouth/Throat: Lips, mucosa, and tongue normal. Teeth and gums normal.   Neck: Supple, symmetrical, trachea midline, no adenopathy, thyroid: no enlargment/tenderness/nodules, no carotid bruit and no JVD. Back:   Symmetric, no curvature. ROM normal. No CVA tenderness. Lungs:   Clear to auscultation bilaterally. Heart:  Regular rate and rhythm, S1, S2 normal, no murmur, click, rub or gallop. Abdomen:   Soft, non-tender. Bowel sounds normal. No masses,  No organomegaly. Extremities: Extremities normal, atraumatic, no cyanosis or edema. Pulses: 2+ and symmetric all extremities. Skin: Skin color, texture, turgor normal. No rashes or lesions   Lymph nodes: Cervical, supraclavicular, and axillary nodes normal.   Neurologic: CNII-XII intact. Normal strength, sensation and reflexes throughout.      Recent Results (from the past 24 hour(s))   GLUCOSE, POC    Collection Time: 01/22/21  8:35 PM   Result Value Ref Range    Glucose (POC) 120 (H) 65 - 100 mg/dL    Performed by Elana Abebe Review:      Recent Results (from the past 24 hour(s))   GLUCOSE, POC    Collection Time: 01/22/21  8:35 PM   Result Value Ref Range    Glucose (POC) 120 (H) 65 - 100 mg/dL    Performed by Jana Lindsay          Assessment:     Active Problems:    AMS (altered mental status) (1/20/2021)      Encephalopathy (1/21/2021)      Stroke (cerebrum) ([facility] [address] (1/22/2021)      Acute/subacute new lacunar infarcts neurology following  Hypertension continue to monitor resume home medications  Encephalopathy slight improvement  Doubt seizure obtain EEG  Obesity  Plan:     Continue with present management.   Neurology evaluation pending    Signed By: Aamir Costello MD     January 23, 2021

## 2021-01-24 NOTE — PROGRESS NOTES
Problem: Mobility Impaired (Adult and Pediatric)  Goal: *Acute Goals and Plan of Care (Insert Text)  Description: Pt will be I with LE HEP in 7 days. Pt will perform supine<>sit with Min A x1 in 7 days. Pt will be bruno to sit EOB for 10 minutes at SBA in 7 days. Pt will perform transfers with Min A x1 in 7 days. Pt will amb 50 feet with LRAD safely with CGA in 7 days. Outcome: Progressing Towards Goal     Problem: Patient Education: Go to Patient Education Activity  Goal: Patient/Family Education  Outcome: Progressing Towards Goal     PHYSICAL THERAPY TREATMENT  Patient: Sara Valles (88 y.o. female)  Date: 1/24/2021  Diagnosis: AMS (altered mental status) [R41.82]  Encephalopathy [G93.40]  Stroke (cerebrum) ([facility] [address] [I63.9] <principal problem not specified>       Precautions: Fall(CVA)  Chart, physical therapy assessment, plan of care and goals were reviewed. ASSESSMENT  Patient continues with skilled PT services and is progressing towards goals. Pt found supine in bed with daughter present, agreeable to PT treatment. Supine to sit with HOB partially elevated Mod A x1. Once sitting EOB, she was able to sit EOB at SBA with verbal and tactile cues to prevent posterior LOB by bringing her COG anteriorly into her JESSICA. Sit to stand from bed at [facility]. Pt was able to ambulate 20 feet with FWW at [facility], [facility] for improved posture and to keep AD close to her body. Pt reported that she needed to use the bathroom, which she was able to complete transfer on and off of the commode at [facility] with VCs to use railing. Ambulation back to bed at [facility] with FWW. Due to pt having poor environmental awareness being distracted easily, PT gave pt instructions to sit in middle of chucks pad at EOB. While doing task, she still required both verbal and tactile cues to complete this task properly. Sit to supine at Mod A. Scooting towards HOB in supine position TA x2. Pt fatigued by the end of treatment.  PT provided pt with HEP of head flexion while in bed to engage abdominal musculature to carry over into truncal stability when sitting EOB to prevent posterior LOB with sitting. Throughout treatment, pt would go on tangents and required max-mod VCs to stay on task. Pt has made  an improvement from yesterday, only requiring one person for bed mobility and transfers. She continues to present with weakness, poor activity tolerance, and poor balance which impairers her ability to safety complete bed mobility, transfers, and ambulation safely by her self. PT continues rec DC to IRF  at this time. Pt lives with daughter and daughter works during the day. Pt would not be safe with  PT at this time. Other factors to consider for discharge: daughter works during the day         PLAN :  Patient continues to benefit from skilled intervention to address the above impairments. Continue treatment per established plan of care. to address goals. Recommendation for discharge: (in order for the patient to meet his/her long term goals)  Therapy 3 hours per day 5-7 days per week    This discharge recommendation:  Has not yet been discussed the attending provider and/or case management    IF patient discharges home will need the following DME: to be determined (TBD)       SUBJECTIVE:   Patient stated I think I need an antibiotic.     OBJECTIVE DATA SUMMARY:   Critical Behavior:  Neurologic State: Alert  Orientation Level: Oriented X4  Cognition: Follows commands  Safety/Judgement: Decreased insight into deficits  Functional Mobility Training:  Bed Mobility:     Supine to Sit: Assist x1; Moderate assistance  Sit to Supine:  Moderate assistance;Assist x1           Transfers:  Sit to Stand: Contact guard assistance;Assist x1  Stand to Sit: Contact guard assistance;Assist x1                             Balance:  Sitting: Impaired  Sitting - Static: Fair (occasional)  Sitting - Dynamic: Fair (occasional)  Standing: Impaired  Standing - Static: Fair  Standing - Dynamic : Good  Ambulation/Gait Training:  Distance (ft): 20 Feet (ft)  Assistive Device: Gait belt;Walker, rolling  Ambulation - Level of Assistance: Contact guard assistance     Gait Description (WDL): Exceptions to WDL           Base of Support: Widened  Stance: Left increased;Right increased     Step Length: Left shortened;Right shortened           Pain Ratin/10 back    Activity Tolerance:   Fair  Please refer to the flowsheet for vital signs taken during this treatment. After treatment patient left in no apparent distress:   Supine in bed, Call bell within reach, and Caregiver / family present    COMMUNICATION/COLLABORATION:   The patients plan of care was discussed with: Registered nurse.      Traci Gomez   Time Calculation: 38 mins

## 2021-01-24 NOTE — ROUTINE PROCESS
Bedside and Verbal shift change report given to  Harman Duarte RN (oncoming nurse) by Khadijah Green (offgoing nurse). Report included the following information SBAR, Kardex, Intake/Output, Accordion and Recent Results.

## 2021-01-24 NOTE — ROUTINE PROCESS
Report given to LOI Robledo RN oncoming nurse to include sbar, mar, kardex, recent orders and changes.

## 2021-01-24 NOTE — ROUTINE PROCESS
Report given to LOI Ling, RN oncoming nurse to [employer] [employer], mar, kardex, recent orders, and changes

## 2021-01-24 NOTE — PROGRESS NOTES
Progress Note    Patient: Aaron Andrade MRN: [id number]  SSN: [id number]    YOB: 1945  Age: 77 y.o. Sex: female      Admit Date: 1/20/2021    LOS: 4 days     Subjective:     Patient denies any chest pain no shortness of breath denies any fever or chills. MRI of the brain shows subacute infarcts x2 with multiple other chronic lacunar infarcts  For PT and OT for placement  Objective:     Vitals:    01/24/21 0834 01/24/21 1200 01/24/21 1540 01/24/21 1600   BP: (!) 146/99  (!) 150/97    Pulse: 82 86 79 79   Resp:   18    Temp:   98.7 °F (37.1 °C)    SpO2:   100%    Weight:       Height:            Intake and Output:  Current Shift: No intake/output data recorded. Last three shifts: No intake/output data recorded. Physical Exam:   General:  Alert, cooperative, no distress, appears stated age. Eyes:  Conjunctivae/corneas clear. PERRL, EOMs intact. Fundi benign   Ears:  Normal TMs and external ear canals both ears. Nose: Nares normal. Septum midline. Mucosa normal. No drainage or sinus tenderness. Mouth/Throat: Lips, mucosa, and tongue normal. Teeth and gums normal.   Neck: Supple, symmetrical, trachea midline, no adenopathy, thyroid: no enlargment/tenderness/nodules, no carotid bruit and no JVD. Back:   Symmetric, no curvature. ROM normal. No CVA tenderness. Lungs:   Clear to auscultation bilaterally. Heart:  Regular rate and rhythm, S1, S2 normal, no murmur, click, rub or gallop. Abdomen:   Soft, non-tender. Bowel sounds normal. No masses,  No organomegaly. Extremities: Extremities normal, atraumatic, no cyanosis or edema. Pulses: 2+ and symmetric all extremities. Skin: Skin color, texture, turgor normal. No rashes or lesions   Lymph nodes: Cervical, supraclavicular, and axillary nodes normal.   Neurologic: CNII-XII intact. Normal strength, sensation and reflexes throughout. No results found for this or any previous visit (from the past 24 hour(s)).        Prow Review:      No results found for this or any previous visit (from the past 24 hour(s)). Assessment:     Active Problems:    AMS (altered mental status) (1/20/2021)      Encephalopathy (1/21/2021)      Stroke (cerebrum) ([facility] [address] (1/22/2021)      Acute/subacute new lacunar infarcts neurology following  Hypertension continue to monitor resume home medications  Encephalopathy slight improvement  Doubt seizure obtain EEG  Obesity  Plan:     Continue with present management. Neurology evaluation pending  For possible discharge in 1 or 2 days.   D/w daughter at length  Signed By: Aamir Costello MD     January 24, 2021

## 2021-01-25 LAB
COVID-19 RAPID TEST, COVR: NOT DETECTED
SARS-COV-2, COV2: NORMAL
SPECIMEN SOURCE: NORMAL

## 2021-01-25 PROCEDURE — 74011000258 HC RX REV CODE- 258: Performed by: INTERNAL MEDICINE

## 2021-01-25 PROCEDURE — 65270000029 HC RM PRIVATE

## 2021-01-25 PROCEDURE — 87635 SARS-COV-2 COVID-19 AMP PRB: CPT

## 2021-01-25 PROCEDURE — 97530 THERAPEUTIC ACTIVITIES: CPT

## 2021-01-25 PROCEDURE — 94640 AIRWAY INHALATION TREATMENT: CPT

## 2021-01-25 PROCEDURE — 92526 ORAL FUNCTION THERAPY: CPT

## 2021-01-25 PROCEDURE — 97110 THERAPEUTIC EXERCISES: CPT

## 2021-01-25 PROCEDURE — 74011250637 HC RX REV CODE- 250/637: Performed by: INTERNAL MEDICINE

## 2021-01-25 PROCEDURE — 99285 EMERGENCY DEPT VISIT HI MDM: CPT

## 2021-01-25 PROCEDURE — 97116 GAIT TRAINING THERAPY: CPT

## 2021-01-25 RX ORDER — ACETAMINOPHEN 500 MG
500 TABLET ORAL
Qty: 30 TAB | Refills: 0 | Status: SHIPPED | OUTPATIENT
Start: 2021-01-25

## 2021-01-25 RX ORDER — TRAMADOL HYDROCHLORIDE 50 MG/1
50 TABLET ORAL
Qty: 12 TAB | Refills: 0 | Status: SHIPPED | OUTPATIENT
Start: 2021-01-25 | End: 2021-01-28

## 2021-01-25 RX ORDER — DILTIAZEM HYDROCHLORIDE 120 MG/1
120 CAPSULE, COATED, EXTENDED RELEASE ORAL DAILY
Qty: 30 CAP | Refills: 0 | Status: SHIPPED | OUTPATIENT
Start: 2021-01-26

## 2021-01-25 RX ORDER — FLUTICASONE PROPIONATE AND SALMETEROL 100; 50 UG/1; UG/1
1 POWDER RESPIRATORY (INHALATION) 2 TIMES DAILY
Qty: 1 INHALER | Refills: 0 | Status: SHIPPED | OUTPATIENT
Start: 2021-01-25

## 2021-01-25 RX ORDER — ALBUTEROL SULFATE 90 UG/1
2 AEROSOL, METERED RESPIRATORY (INHALATION)
Qty: 1 INHALER | Refills: 0 | Status: SHIPPED | OUTPATIENT
Start: 2021-01-25

## 2021-01-25 RX ORDER — BACLOFEN 5 MG/1
5 TABLET ORAL 3 TIMES DAILY
Qty: 90 TAB | Refills: 0 | Status: SHIPPED | OUTPATIENT
Start: 2021-01-25

## 2021-01-25 RX ORDER — FAMOTIDINE 20 MG/1
20 TABLET, FILM COATED ORAL EVERY 12 HOURS
Qty: 60 TAB | Refills: 0 | Status: SHIPPED | OUTPATIENT
Start: 2021-01-25

## 2021-01-25 RX ORDER — BISACODYL 5 MG
5 TABLET, DELAYED RELEASE (ENTERIC COATED) ORAL
Qty: 30 TAB | Refills: 0 | Status: SHIPPED | OUTPATIENT
Start: 2021-01-25

## 2021-01-25 RX ORDER — FLECAINIDE ACETATE 50 MG/1
50 TABLET ORAL 2 TIMES DAILY
Qty: 60 TAB | Refills: 0 | Status: SHIPPED | OUTPATIENT
Start: 2021-01-25

## 2021-01-25 RX ORDER — LOSARTAN POTASSIUM 50 MG/1
50 TABLET ORAL DAILY
Qty: 30 TAB | Refills: 0 | Status: SHIPPED | OUTPATIENT
Start: 2021-01-26

## 2021-01-25 RX ORDER — DOCUSATE SODIUM 100 MG/1
100 CAPSULE, LIQUID FILLED ORAL 2 TIMES DAILY
Qty: 60 CAP | Refills: 2 | Status: SHIPPED | OUTPATIENT
Start: 2021-01-25 | End: 2021-04-25

## 2021-01-25 RX ADMIN — Medication 10 ML: at 05:23

## 2021-01-25 RX ADMIN — DEXTROSE AND SODIUM CHLORIDE 75 ML/HR: 5; 450 INJECTION, SOLUTION INTRAVENOUS at 05:23

## 2021-01-25 RX ADMIN — BUDESONIDE AND FORMOTEROL FUMARATE DIHYDRATE 2 PUFF: 160; 4.5 AEROSOL RESPIRATORY (INHALATION) at 19:15

## 2021-01-25 RX ADMIN — DILTIAZEM HYDROCHLORIDE 120 MG: 120 CAPSULE, COATED, EXTENDED RELEASE ORAL at 09:34

## 2021-01-25 RX ADMIN — Medication 10 ML: at 13:15

## 2021-01-25 RX ADMIN — LOSARTAN POTASSIUM 50 MG: 50 TABLET, FILM COATED ORAL at 09:34

## 2021-01-25 RX ADMIN — DOCUSATE SODIUM 50 MG AND SENNOSIDES 8.6 MG 1 TABLET: 8.6; 5 TABLET, FILM COATED ORAL at 09:34

## 2021-01-25 RX ADMIN — ACETAMINOPHEN 500 MG: 500 TABLET, FILM COATED ORAL at 13:14

## 2021-01-25 RX ADMIN — RIVAROXABAN 20 MG: 10 TABLET, FILM COATED ORAL at 18:15

## 2021-01-25 RX ADMIN — TRAMADOL HYDROCHLORIDE 50 MG: 50 TABLET, COATED ORAL at 11:08

## 2021-01-25 RX ADMIN — ATORVASTATIN CALCIUM 10 MG: 10 TABLET, FILM COATED ORAL at 22:27

## 2021-01-25 RX ADMIN — ACETAMINOPHEN 500 MG: 500 TABLET, FILM COATED ORAL at 18:14

## 2021-01-25 RX ADMIN — ACETAMINOPHEN 500 MG: 500 TABLET, FILM COATED ORAL at 22:28

## 2021-01-25 RX ADMIN — BUDESONIDE AND FORMOTEROL FUMARATE DIHYDRATE 2 PUFF: 160; 4.5 AEROSOL RESPIRATORY (INHALATION) at 08:08

## 2021-01-25 RX ADMIN — ACETAMINOPHEN 500 MG: 500 TABLET, FILM COATED ORAL at 09:34

## 2021-01-25 RX ADMIN — DEXTROSE AND SODIUM CHLORIDE 75 ML/HR: 5; 450 INJECTION, SOLUTION INTRAVENOUS at 18:18

## 2021-01-25 RX ADMIN — ACETAMINOPHEN 500 MG: 500 TABLET, FILM COATED ORAL at 05:25

## 2021-01-25 RX ADMIN — AMIODARONE HYDROCHLORIDE 400 MG: 200 TABLET ORAL at 09:34

## 2021-01-25 RX ADMIN — BACLOFEN 5 MG: 10 TABLET ORAL at 09:34

## 2021-01-25 RX ADMIN — Medication 10 ML: at 22:28

## 2021-01-25 RX ADMIN — BACLOFEN 5 MG: 10 TABLET ORAL at 18:15

## 2021-01-25 RX ADMIN — FLECAINIDE ACETATE 50 MG: 50 TABLET ORAL at 09:34

## 2021-01-25 RX ADMIN — FAMOTIDINE 20 MG: 20 TABLET, FILM COATED ORAL at 22:27

## 2021-01-25 RX ADMIN — FLECAINIDE ACETATE 50 MG: 50 TABLET ORAL at 22:27

## 2021-01-25 RX ADMIN — BACLOFEN 5 MG: 10 TABLET ORAL at 22:27

## 2021-01-25 RX ADMIN — FAMOTIDINE 20 MG: 20 TABLET, FILM COATED ORAL at 09:33

## 2021-01-25 NOTE — ROUTINE PROCESS
Bedside and Verbal shift change report given to Priyanka Kenny LPN(oncoming nurse) by Royce Barber (offgoing nurse). Report included the following information SBAR, Kardex, MAR, Accordion and Recent Results.

## 2021-01-25 NOTE — PROGRESS NOTES
OCCUPATIONAL THERAPY TREATMENT  Patient: Ayad Riggs (28 y.o. female)  Date: 1/25/2021  Diagnosis: AMS (altered mental status) [R41.82]  Encephalopathy [G93.40]  Stroke (cerebrum) ([facility] [address] [I63.9] <principal problem not specified>       Precautions: Fall(CVA)  Chart, occupational therapy assessment, plan of care, and goals were reviewed. ASSESSMENT  Patient continues with skilled OT services and is progressing towards goals. Pt. Received semi- supine upon arrival and agreeable to therapy session. Pt.improved with mobility today requiring Min A x2. Pt. Needed vcs and min assist for sitting on the side of bed d/t pt leaning posteriorly while sitting at EOB initially. Pt was able to correct posterior while sitting at EOB with vcs for hand placement. Pt. Performed sit-> stand with min A + 1 for safety and line management. Pt. Also needs constant vcs and tactile cues for upright posture during functional ambulation d/t pt demonstrating forward, flexed posture. Pt. Performed toilet transfer with min A. Pt. Able to stand at sink to wash hands and required min assist for balance at sink d/t pt leaning over RW and using RW for stability for standing. Pt. Continues to display poor safety awareness and easily distracted throughout RX requiring increased cues for re-direction. PLAN :  Patient continues to benefit from skilled intervention to address the above impairments. Continue treatment per established plan of care. to address goals. Recommendation for discharge: (in order for the patient to meet his/her long term goals)  Therapy 3 hours per day 5-7 days per week    This discharge recommendation:  Has been made in collaboration with the attending provider and/or case management    IF patient discharges home will need the following DME: TBD       SUBJECTIVE:   Patient stated im having pain in both of my ankles .     OBJECTIVE DATA SUMMARY:   Cognitive/Behavioral Status:  Neurologic State: Alert  Orientation Level: Oriented X4  Cognition: Follows commands       Functional Mobility and Transfers for ADLs:  Bed Mobility:  Rolling: Minimum assistance  Supine to Sit: Minimum assistance;Assist x2  Scooting: Minimum assistance    Transfers:  Sit to Stand: Minimum assistance(Plus 1 for safety and line management)     Bed to Chair: Minimum assistance    Balance:  Sitting: Impaired  Sitting - Static: Fair (occasional)(tactile and vcs constant to maintain balance)  Sitting - Dynamic: Poor (constant support)  Standing: Impaired; With support  Standing - Static: Fair  Standing - Dynamic : Fair(Pt. leaning posteriorly at times)    ADL Intervention:    Lower Body Dressing Assistance  Underpants: Total assistance (dependent)  Position Performed: Standing    Toileting  Toileting Assistance: Minimum assistance      Therapeutic Exercises:   Exercise Sets Reps AROM AAROM PROM Self PROM Comments   Shoulder flex/ext 1 10 [x] [] [] []    Elbow flex/ext 1 10 [x] [] [] []    Wrist flex/ext 1 10 [x] [] [] []    Functional reaching OOB base of support 1 10 [x] [] [] []          Pain:  7/10 pain in ankles    Activity Tolerance:   Good  Please refer to the flowsheet for vital signs taken during this treatment. After treatment patient left in no apparent distress:   Sitting in chair, Heels elevated for pressure relief, and Call bell within reach    COMMUNICATION/COLLABORATION:   The patients plan of care was discussed with: Physical therapy assistant and Registered nurse. Co-tx with PTA for increased safety for functional mobility and transfers.       Geovany Hauser  Time Calculation: 48 mins    Problem: Self Care Deficits Care Plan (Adult)  Goal: *Acute Goals and Plan of Care (Insert Text)  Description: Pt will be mod I sup<->sit in prep for EOB ADL's  Pt will be SUP LB dressing EOB level  Pt will be mod I sit EOB 10 minutes in prep for EOB ADL's  Pt will be mod I grooming EOB level  Pt will be min A x 1 sit<-> prep for toilet transfer  Pt will be SUP BSC/toilet transfer with LRAD  Pt will be mod I toileting/cloth mgmt LRAD  Pt will be CGA x 1 grooming standing sink  Pt will be MI gabriella UE HEP in prep for self care tasks       Outcome: Progressing Towards Goal

## 2021-01-25 NOTE — PROGRESS NOTES
Pt was approved by [facility], however they need a negative COVID within 48 hrs of discharge. [facility] doesn't have any beds available today. Kain spoke with Dr. Arslan Garzon regarding ordering a rapid COVID test. MD was agreeable. KAIN called nursing supervisor - Fam Lai. CM can order a rapid COVID test. Cm notified pt's primary nurse.

## 2021-01-25 NOTE — PROGRESS NOTES
SPEECH LANGUAGE PATHOLOGY DYSPHAGIA TREATMENT  Patient: Heron Lopez (68 y.o. female)  Date: 1/25/2021  Diagnosis: AMS (altered mental status) [R41.82]  Encephalopathy [G93.40]  Stroke (cerebrum) ([facility] [address] [I63.9] <principal problem not specified>       Precautions: aspiration Fall(CVA)    ASSESSMENT:  Patient seen sitting chairside in room. She reports tolerating her diet w/o difficulty but would like an upgrade. She continues w/ no dysarthria or aphasia. She does reports difficulty w/ her memory and underlying cognitive deficits are suspected. Administered thin liquids via straw. Swallow initiation timely. HLE and protraction adequate. No overt s/sx of aspiration noted. Patient w/ difficulty biting hard solid cookie s/t edentulous. She was able to [facility] w/ increased time. No pharyngeal difficulty noted. PLAN:  Recommendations and Planned Interventions:  Diet advancement to regular, thin. SLP to follow-up to ensure diet tolerance. Patient would benefit from cognitive-linguistic evaluation post discharge. Patient will need some type of supervision at home if she plans to discharge home. Patient continues to benefit from skilled intervention to address the above impairments. Continue treatment per established plan of care. Frequency/Duration: Patient will be followed by speech-language pathology 1 time a week to address goals. Discharge Recommendations: To Be Determined     SUBJECTIVE:   Patient seen at chairside in room. She is requesting a purewick.      OBJECTIVE:     CXR Results  (Last 48 hours)      None          CT Results  (Last 48 hours)      None           Cognitive and Communication Status:  Neurologic State: Alert, Eyes open spontaneously  Orientation Level: Oriented X4  Cognition: Follows commands     Perseveration: Perseverates during ADLS, Perseverates during conversation, Verbal cues provided, Tactile cues provided  Safety/Judgement: Decreased insight into deficits      Pain:  Pain Scale 1: Numeric (0 - 10)  Pain Intensity 1: 8  Pain Location 1: Ankle;Knee    After treatment:   Patient left in no apparent distress sitting up in chair    COMMUNICATION/EDUCATION:   Patient was educated regarding her deficit(s) of dysphagia, swallow safety precautions, diet recs and POC. She demonstrated Fair understanding as evidenced by verbal understanding. Leah Umana M.S. CCC-SLP  Time Calculation: 8 mins           Problem: Dysphagia (Adult)  Goal: *Acute Goals and Plan of Care (Insert Text)  Description: Speech Therapy Swallow Goals  Initiated 1/22/2021  -Patient will tolerate chopped diet with thin liquids without clinical indicators of aspiration given no cues within 5-7 day(s). [ ] Not met  [ ]  MET   [ ] Progressing  [ ] Zeina Limon  -Patient will tolerate PO trials without clinical indicators of aspiration given no cues within 5-7 day(s). [ ] Not met  [ ]  MET   [ ] Progressing  [ ] Zeina Limon  -Patient will participate in modified barium swallow study within 5-7 day(s). [ ] Not met  [ ]  MET   [ ] Progressing  [ ] Zeina Stains  -Patient will demonstrate understanding of swallow safety precautions and aspiration precautions, diet recs with no cues within 5-7 day(s). [ ] Not met  [ ]  MET   [ ] Progressing  [ ] Discontinue  -Complete cognitive-linguistic evaluation as warranted.               [ ] Not met  [ ]  MET   [ ] Julisa Eckert  [ ] Zeina Limon     1/25/2021 1339 by Danilea Calixto  Outcome: Progressing Towards Goal

## 2021-01-25 NOTE — PROGRESS NOTES
Cm spoke with pt's daughter Rainelle Boast. Daughter would like her mother discharged to [facility] inpatient rehab. Cm explained to her we are doing a rapid COVID test. Daughter indicated she will bring her mother's belongings to the hospital this evening to take with her when she goes to IRF. Joe will f/up with Shekhar tomorrow to coordinate dc once COVID test is resulted.

## 2021-01-25 NOTE — PROGRESS NOTES
PT is rec. IRF. Cm met with pt and pt's daughter Lonnie Frias at the bedside to f/up on pt's plan of care. Cm discussed and educated IRF vs SNF. Daughter is interested in IRF. Pt wanted her daughter to sign the paperwork. Choice obtained for [facility]. Cm presented and discussed IMM. IMM obtained. Referrals made via [facility].

## 2021-01-25 NOTE — PROGRESS NOTES
NEURO PROGRESS NOTE        SUBJECTIVE:     Reported seizure, chronic kidney failure    EXAM:  Awake, responds verbally. Oriented to day, year, month, not aphasic. No seizures reported      ASSESSMENT/PLAN:  Current treatment continues.   Neurophysiology pending    ALLERGIES:    Allergies   Allergen Reactions    Egg Nausea and Vomiting       MEDS:      Current Facility-Administered Medications:     acetaminophen (TYLENOL) tablet 650 mg, 650 mg, Oral, Q4H PRN **OR** acetaminophen (TYLENOL) solution 650 mg, 650 mg, Per NG tube, Q4H PRN **OR** acetaminophen (TYLENOL) suppository 650 mg, 650 mg, Rectal, Q4H PRN, Jaswant Rosales MD    docusate sodium (COLACE) capsule 100 mg, 100 mg, Oral, BID, Jaswant Tanner MD, 100 mg at 01/24/21 0835    famotidine (PEPCID) tablet 20 mg, 20 mg, Oral, Q12H, Jaswant Tanner MD, 20 mg at 01/25/21 0933    acetaminophen (TYLENOL) tablet 500 mg, 500 mg, Oral, Q4HWA, Jaswant Tanner MD, 500 mg at 01/25/21 0934    albuterol (PROVENTIL HFA, VENTOLIN HFA, PROAIR HFA) inhaler 2 Puff, 2 Puff, Inhalation, Q4H PRN, Jaswant Rosales MD    amiodarone (CORDARONE) tablet 400 mg, 400 mg, Oral, DAILY, Jaswant Tanner MD, 400 mg at 01/25/21 0934    atorvastatin (LIPITOR) tablet 10 mg, 10 mg, Oral, QHS, Jaswant Tanner MD, 10 mg at 01/24/21 2236    baclofen (LIORESAL) tablet 5 mg, 5 mg, Oral, TID, Jaswant Rosales MD, 5 mg at 01/25/21 0934    dilTIAZem ER (CARDIZEM CD) capsule 120 mg, 120 mg, Oral, DAILY, Jaswant Tanner MD, 120 mg at 01/25/21 0934    flecainide (TAMBOCOR) tablet 50 mg, 50 mg, Oral, BID, Jaswant Tanner MD, 50 mg at 01/25/21 0934    budesonide-formoteroL (SYMBICORT) 160-4.5 mcg/actuation HFA inhaler 2 Puff, 2 Puff, Inhalation, BID RT, Krystle CLEMENTE MD, 2 Puff at 01/25/21 0808    losartan (COZAAR) tablet 50 mg, 50 mg, Oral, DAILY, Jaswant Tanner MD, 50 mg at 01/25/21 0934    rivaroxaban (XARELTO) tablet 20 mg, 20 mg, Oral, DAILY WITH DINNER, Flores, Nisa Hwang MD, 20 mg at 01/24/21 1621    senna-docusate (PERICOLACE) 8.6-50 mg per tablet 1 Tab, 1 Tab, Oral, BID, Shanice Riojas MD, 1 Tab at 01/25/21 0934    sodium chloride (NS) flush 5-40 mL, 5-40 mL, IntraVENous, Q8H, Jaswant Tanner MD, 10 mL at 01/25/21 0523    sodium chloride (NS) flush 5-40 mL, 5-40 mL, IntraVENous, PRN, Jaswant Tanner MD    dextrose 5 % - 0.45% NaCl infusion, 75 mL/hr, IntraVENous, CONTINUOUS, Jaswant Tanner MD, Last Rate: 75 mL/hr at 01/25/21 0523, 75 mL/hr at 01/25/21 0523    acetaminophen (TYLENOL) solution 650 mg, 650 mg, Oral, Q4H PRN, Jaswant Barroso MD    bisacodyL (DULCOLAX) tablet 5 mg, 5 mg, Oral, DAILY PRN, Heladio CLEMENTE MD, 5 mg at 01/22/21 1025    traMADoL (ULTRAM) tablet 50 mg, 50 mg, Oral, BID PRN, Heladio CLEMENTE MD, 50 mg at 01/22/21 1024    LABS:  No results found for this or any previous visit (from the past 24 hour(s)). Visit Vitals  BP (!) 161/99 (BP 1 Location: Left arm)   Pulse 90   Temp 98.6 °F (37 °C)   Resp 18   Ht 5' 5\" (1.651 m)   Wt 89.7 kg (197 lb 12 oz)   SpO2 95%   BMI 32.91 kg/m²       Imaging:  MRI BRAIN WO CONT   Final Result   Impression:   1. Subacute lacunar infarct of the right corona radiata and possible punctate   subacute infarct of the splenium of the corpus callosum. 2.  Generalized volume loss, bifrontal and right parietal encephalomalacia,   chronic bilateral cerebellar infarcts, several chronic microhemorrhages, and   advanced chronic small vessel ischemic changes. 3.  Diminished left vertebral artery flow void which may be related to   developmental hypoplasia versus age-indeterminate occlusion or flow-limiting   stenosis. CT HEAD WO CONT   Final Result   Impression:   1. No evidence of an acute intracranial abnormality. 2.  Multifocal chronic infarcts/encephalomalacia and evidence of chronic small   vessel ischemic changes.       XR CHEST SNGL V   Final Result

## 2021-01-25 NOTE — PROGRESS NOTES
Problem: Mobility Impaired (Adult and Pediatric)  Goal: *Acute Goals and Plan of Care (Insert Text)  Description: Pt will be I with LE HEP in 7 days. Pt will perform supine<>sit with Min A x1 in 7 days. Pt will be bruno to sit EOB for 10 minutes at SBA in 7 days. Pt will perform transfers with Min A x1 in 7 days. Pt will amb 50 feet with LRAD safely with CGA in 7 days. Outcome: Progressing Towards Goal   PHYSICAL THERAPY TREATMENT  Patient: Robert Campos (30 y.o. female)  Date: 1/25/2021  Diagnosis: AMS (altered mental status) [R41.82]  Encephalopathy [G93.40]  Stroke (cerebrum) ([facility] [address] [I63.9] <principal problem not specified>       Precautions: Fall(CVA)  Chart, physical therapy assessment, plan of care and goals were reviewed. ASSESSMENT  Patient continues with skilled PT services and is progressing towards goals. Pt. Supine upon arrival and ready to get into chair. Pt.improved with mobility today. Pt. Needed vcs and min assist for sitting on the side of bed. Pt. Leaning posteriorly while sitting. Pt. Was able to correct with min assist and vcs. Pt. Not aware of leaning until pt. Needs assist to come back to midline. Pt. Also needs constant vcs and tactile cues for upright posture during Tfs and gt. Pt. Tends to demonstrate forward, flexed posture. Pt. Ambulated 43' with RW and CGA X 1 plus 1 for safety and line management. Pt. Stood at sink and was able to wash hands but needed min assist for balance at sink. Pt. Continues to display poor safety awareness and easily distracted throughout RX. Current Level of Function Impacting Discharge (mobility/balance): Assistance    Other factors to consider for discharge: Safety         PLAN :  Patient continues to benefit from skilled intervention to address the above impairments. Continue treatment per established plan of care. to address goals.     Recommendation for discharge: (in order for the patient to meet his/her long term goals)  Therapy 3 hours per day 5-7 days per week    This discharge recommendation:  Has been made in collaboration with the attending provider and/or case management    IF patient discharges home will need the following DME: IRF       SUBJECTIVE:   Patient stated I have pain in both of my ankles. \" Pt. Agreed to get into chair. CO RX with OT for increased mobility and safety. OBJECTIVE DATA SUMMARY:   Critical Behavior:  Neurologic State: Alert, Eyes open spontaneously  Orientation Level: Oriented X4  Cognition: Follows commands  Safety/Judgement: Decreased insight into deficits  Functional Mobility Training:  Bed Mobility:  Rolling: Minimum assistance  Supine to Sit: Minimum assistance;Assist x2     Scooting: Minimum assistance   Worked on midline and leaning forward with sitting balance. OT assisted with reaching activities while inn chair. Transfers:  Sit to Stand: Minimum assistance(Plus 1 for safety and line management)  Stand to Sit: Minimum assistance        Bed to Chair: Minimum assistance         Worked on static standing balance and upright posture. Vcs /tactile cues for pt. To tuck in bottom           Balance:  Sitting: Impaired  Sitting - Static: Fair (occasional)(tactile and vcs constant to maintain balance)  Sitting - Dynamic: Poor (constant support)  Standing: Impaired; With support  Standing - Static: Fair  Standing - Dynamic : Fair(Pt. leaning posteriorly at times)  Ambulation/Gait Training:  Distance (ft): 42 Feet (ft)  Assistive Device: Walker, rolling  Ambulation - Level of Assistance: Contact guard assistance                 Base of Support: Widened     Speed/Rachele: Slow  Step Length: Left shortened;Right shortened                 Therapeutic Exercises: Therapeutic Exercises:       EXERCISE   Sets   Reps   Active Active Assist   Passive Self ROM   Comments   Ankle Pumps  12 [x] [] [] []    Quad Sets/Glut Sets   [] [] [] []    Hamstring Sets   [] [] [] []    Short Arc Quads   [] [] [] []    Heel Slides [] [] [] []    Straight Leg Raises   [] [] [] []    Hip abd/add  12 [x] [] [] []    Long Arc Quads  12 [x] [] [] []    Marching  12 [x] [] [] []       [] [] [] []        Pain 7/10 Ankles       Activity Tolerance:   Fair and requires rest breaks  Please refer to the flowsheet for vital signs taken during this treatment. After treatment patient left in no apparent distress:   Sitting in chair, Call bell within reach, and RN notified of RX and in chair. COMMUNICATION/COLLABORATION:   The patients plan of care was discussed with: Occupational therapy assistant and Registered nurse.      Mario Sam   Time Calculation: 48 mins

## 2021-01-25 NOTE — DISCHARGE SUMMARY
Discharge Summary     Patient: Javier Duran MRN: [id number]  SSN: [id number]    YOB: 1945  Age: 77 y.o. Sex: female       Admit Date: 1/20/2021    Discharge Date: 1/25/2021      Admission Diagnoses: AMS (altered mental status) [R41.82]  Encephalopathy [G93.40]  Stroke (cerebrum) ([facility] [address] [I63.9]    Discharge Diagnoses:   Problem List as of 1/25/2021 Never Reviewed          Codes Class Noted - Resolved    Stroke (cerebrum) ([facility] [address] ICD-10-CM: I63.9  ICD-9-CM: 434.91  1/22/2021 - Present        Encephalopathy ICD-10-CM: G93.40  ICD-9-CM: 348.30  1/21/2021 - Present        AMS (altered mental status) ICD-10-CM: R41.82  ICD-9-CM: 780.97  1/20/2021 - Present        Status post total left knee replacement ICD-10-CM: L60.860  ICD-9-CM: V43.65  12/31/2015 - Present               Discharge Condition: Good    Hospital Course: 77years old patient was brought in for strokelike event. Patient was recently evaluated for CVA. CT and MRI of the brain showed new subacute lacunar infarct. She is on Xarelto and aspirin    Consults: Neurology    Significant Diagnostic Studies: labs:     MRI BRAIN WO CONT   Final Result   Impression:   1. Subacute lacunar infarct of the right corona radiata and possible punctate   subacute infarct of the splenium of the corpus callosum. 2.  Generalized volume loss, bifrontal and right parietal encephalomalacia,   chronic bilateral cerebellar infarcts, several chronic microhemorrhages, and   advanced chronic small vessel ischemic changes. 3.  Diminished left vertebral artery flow void which may be related to   developmental hypoplasia versus age-indeterminate occlusion or flow-limiting   stenosis. CT HEAD WO CONT   Final Result   Impression:   1. No evidence of an acute intracranial abnormality. 2.  Multifocal chronic infarcts/encephalomalacia and evidence of chronic small   vessel ischemic changes.       XR CHEST SNGL V   Final Result          Disposition: SNF    Discharge Medications:   Current Discharge Medication List      START taking these medications    Details   bisacodyL (DULCOLAX) 5 mg EC tablet Take 1 Tab by mouth daily as needed for Constipation. Qty: 30 Tab, Refills: 0      docusate sodium (COLACE) 100 mg capsule Take 1 Cap by mouth two (2) times a day for 90 days. Qty: 60 Cap, Refills: 2      famotidine (PEPCID) 20 mg tablet Take 1 Tab by mouth every twelve (12) hours. Qty: 60 Tab, Refills: 0      traMADoL (ULTRAM) 50 mg tablet Take 1 Tab by mouth two (2) times daily as needed for Pain for up to 3 days. Max Daily Amount: 100 mg. Qty: 12 Tab, Refills: 0    Associated Diagnoses: Status post total left knee replacement         CONTINUE these medications which have CHANGED    Details   acetaminophen (TYLENOL) 500 mg tablet Take 1 Tab by mouth every four (4) hours (while awake). Qty: 30 Tab, Refills: 0      albuterol (PROVENTIL HFA, VENTOLIN HFA, PROAIR HFA) 90 mcg/actuation inhaler Take 2 Puffs by inhalation every four (4) hours as needed for Wheezing. Qty: 1 Inhaler, Refills: 0      baclofen 5 mg tab Take 5 mg by mouth three (3) times daily. Qty: 90 Tab, Refills: 0      dilTIAZem ER (Cardizem CD) 120 mg capsule Take 1 Cap by mouth daily. Qty: 30 Cap, Refills: 0      flecainide (TAMBOCOR) 50 mg tablet Take 1 Tab by mouth two (2) times a day. Qty: 60 Tab, Refills: 0      fluticasone propion-salmeteroL (Advair Diskus) 100-50 mcg/dose diskus inhaler Take 1 Puff by inhalation two (2) times a day. Qty: 1 Inhaler, Refills: 0      losartan (COZAAR) 50 mg tablet Take 1 Tab by mouth daily. Qty: 30 Tab, Refills: 0      rivaroxaban (XARELTO) 20 mg tab tablet Take 1 Tab by mouth daily (with dinner). Qty: 30 Tab, Refills: 0         CONTINUE these medications which have NOT CHANGED    Details   aspirin 81 mg chewable tablet Take 81 mg by mouth daily. atorvastatin (LIPITOR) 20 mg tablet Take 10 mg by mouth nightly.          STOP taking these medications oxyCODONE IR (ROXICODONE) 10 mg tab immediate release tablet Comments:   Reason for Stopping:         amiodarone (PACERONE) 400 mg tablet Comments:   Reason for Stopping:         senna-docusate (PERICOLACE) 8.6-50 mg per tablet Comments:   Reason for Stopping:         triamterene-hydroCHLOROthiazide (DYAZIDE) 37.5-25 mg per capsule Comments:   Reason for Stopping:         oxyCODONE IR (ROXICODONE) 5 mg immediate release tablet Comments:   Reason for Stopping:         fluticasone-salmeterol (ADVAIR) 250-50 mcg/dose diskus inhaler Comments:   Reason for Stopping:         OTHER,NON-FORMULARY, Comments:   Reason for Stopping:               Activity: Activity as tolerated  Diet: Resume previous diet  Wound Care: None needed and     Follow-up Appointments   Procedures    FOLLOW UP VISIT Appointment in: 3 - 5 Days     Standing Status:   Standing     Number of Occurrences:   1     Order Specific Question:   Appointment in     Answer:   3 - 5 Days     40 minutes discharge time  Signed By: Ismael Plascencia MD

## 2021-01-26 VITALS
HEIGHT: 65 IN | SYSTOLIC BLOOD PRESSURE: 161 MMHG | BODY MASS INDEX: 32.95 KG/M2 | WEIGHT: 197.75 LBS | DIASTOLIC BLOOD PRESSURE: 99 MMHG | HEART RATE: 90 BPM | TEMPERATURE: 98.6 F | OXYGEN SATURATION: 98 % | RESPIRATION RATE: 18 BRPM

## 2021-01-26 PROCEDURE — 94760 N-INVAS EAR/PLS OXIMETRY 1: CPT

## 2021-01-26 PROCEDURE — 74011000258 HC RX REV CODE- 258: Performed by: INTERNAL MEDICINE

## 2021-01-26 PROCEDURE — 94640 AIRWAY INHALATION TREATMENT: CPT

## 2021-01-26 PROCEDURE — 74011250637 HC RX REV CODE- 250/637: Performed by: INTERNAL MEDICINE

## 2021-01-26 RX ADMIN — FAMOTIDINE 20 MG: 20 TABLET, FILM COATED ORAL at 09:00

## 2021-01-26 RX ADMIN — AMIODARONE HYDROCHLORIDE 400 MG: 200 TABLET ORAL at 09:10

## 2021-01-26 RX ADMIN — Medication 10 ML: at 06:00

## 2021-01-26 RX ADMIN — LOSARTAN POTASSIUM 50 MG: 50 TABLET, FILM COATED ORAL at 09:10

## 2021-01-26 RX ADMIN — ACETAMINOPHEN 500 MG: 500 TABLET, FILM COATED ORAL at 05:25

## 2021-01-26 RX ADMIN — BACLOFEN 5 MG: 10 TABLET ORAL at 09:11

## 2021-01-26 RX ADMIN — DILTIAZEM HYDROCHLORIDE 120 MG: 120 CAPSULE, COATED, EXTENDED RELEASE ORAL at 09:10

## 2021-01-26 RX ADMIN — FLECAINIDE ACETATE 50 MG: 50 TABLET ORAL at 09:11

## 2021-01-26 RX ADMIN — ACETAMINOPHEN 500 MG: 500 TABLET, FILM COATED ORAL at 09:10

## 2021-01-26 RX ADMIN — TRAMADOL HYDROCHLORIDE 50 MG: 50 TABLET, COATED ORAL at 09:10

## 2021-01-26 RX ADMIN — BUDESONIDE AND FORMOTEROL FUMARATE DIHYDRATE 2 PUFF: 160; 4.5 AEROSOL RESPIRATORY (INHALATION) at 08:21

## 2021-01-26 RX ADMIN — DEXTROSE AND SODIUM CHLORIDE 75 ML/HR: 5; 450 INJECTION, SOLUTION INTRAVENOUS at 05:41

## 2021-01-26 NOTE — PROGRESS NOTES
Discharge Summary     Patient: Maria L Ray MRN: [id number]  SSN: [id number]    YOB: 1945  Age: 77 y.o. Sex: female       Admit Date: 1/20/2021    Discharge Date: 1/25/2021      Admission Diagnoses: AMS (altered mental status) [R41.82]  Encephalopathy [G93.40]  Stroke (cerebrum) ([facility] [address] [I63.9]    Discharge Diagnoses:   Problem List as of 1/25/2021 Never Reviewed          Codes Class Noted - Resolved    Stroke (cerebrum) ([facility] [address] ICD-10-CM: I63.9  ICD-9-CM: 434.91  1/22/2021 - Present        Encephalopathy ICD-10-CM: G93.40  ICD-9-CM: 348.30  1/21/2021 - Present        AMS (altered mental status) ICD-10-CM: R41.82  ICD-9-CM: 780.97  1/20/2021 - Present        Status post total left knee replacement ICD-10-CM: E92.311  ICD-9-CM: V43.65  12/31/2015 - Present               Discharge Condition: Good    Hospital Course: 77years old patient was brought in for strokelike event. Patient was recently evaluated for CVA. CT and MRI of the brain showed new subacute lacunar infarct. She is on Xarelto and aspirin    Consults: Neurology    Significant Diagnostic Studies: labs:     MRI BRAIN WO CONT   Final Result   Impression:   1. Subacute lacunar infarct of the right corona radiata and possible punctate   subacute infarct of the splenium of the corpus callosum. 2.  Generalized volume loss, bifrontal and right parietal encephalomalacia,   chronic bilateral cerebellar infarcts, several chronic microhemorrhages, and   advanced chronic small vessel ischemic changes. 3.  Diminished left vertebral artery flow void which may be related to   developmental hypoplasia versus age-indeterminate occlusion or flow-limiting   stenosis. CT HEAD WO CONT   Final Result   Impression:   1. No evidence of an acute intracranial abnormality. 2.  Multifocal chronic infarcts/encephalomalacia and evidence of chronic small   vessel ischemic changes.       XR CHEST SNGL V   Final Result          Disposition: SNF    Discharge Medications:   Current Discharge Medication List      START taking these medications    Details   bisacodyL (DULCOLAX) 5 mg EC tablet Take 1 Tab by mouth daily as needed for Constipation. Qty: 30 Tab, Refills: 0      docusate sodium (COLACE) 100 mg capsule Take 1 Cap by mouth two (2) times a day for 90 days. Qty: 60 Cap, Refills: 2      famotidine (PEPCID) 20 mg tablet Take 1 Tab by mouth every twelve (12) hours. Qty: 60 Tab, Refills: 0      traMADoL (ULTRAM) 50 mg tablet Take 1 Tab by mouth two (2) times daily as needed for Pain for up to 3 days. Max Daily Amount: 100 mg. Qty: 12 Tab, Refills: 0    Associated Diagnoses: Status post total left knee replacement         CONTINUE these medications which have CHANGED    Details   acetaminophen (TYLENOL) 500 mg tablet Take 1 Tab by mouth every four (4) hours (while awake). Qty: 30 Tab, Refills: 0      albuterol (PROVENTIL HFA, VENTOLIN HFA, PROAIR HFA) 90 mcg/actuation inhaler Take 2 Puffs by inhalation every four (4) hours as needed for Wheezing. Qty: 1 Inhaler, Refills: 0      baclofen 5 mg tab Take 5 mg by mouth three (3) times daily. Qty: 90 Tab, Refills: 0      dilTIAZem ER (Cardizem CD) 120 mg capsule Take 1 Cap by mouth daily. Qty: 30 Cap, Refills: 0      flecainide (TAMBOCOR) 50 mg tablet Take 1 Tab by mouth two (2) times a day. Qty: 60 Tab, Refills: 0      fluticasone propion-salmeteroL (Advair Diskus) 100-50 mcg/dose diskus inhaler Take 1 Puff by inhalation two (2) times a day. Qty: 1 Inhaler, Refills: 0      losartan (COZAAR) 50 mg tablet Take 1 Tab by mouth daily. Qty: 30 Tab, Refills: 0      rivaroxaban (XARELTO) 20 mg tab tablet Take 1 Tab by mouth daily (with dinner). Qty: 30 Tab, Refills: 0         CONTINUE these medications which have NOT CHANGED    Details   aspirin 81 mg chewable tablet Take 81 mg by mouth daily. atorvastatin (LIPITOR) 20 mg tablet Take 10 mg by mouth nightly.          STOP taking these medications oxyCODONE IR (ROXICODONE) 10 mg tab immediate release tablet Comments:   Reason for Stopping:         amiodarone (PACERONE) 400 mg tablet Comments:   Reason for Stopping:         senna-docusate (PERICOLACE) 8.6-50 mg per tablet Comments:   Reason for Stopping:         triamterene-hydroCHLOROthiazide (DYAZIDE) 37.5-25 mg per capsule Comments:   Reason for Stopping:         oxyCODONE IR (ROXICODONE) 5 mg immediate release tablet Comments:   Reason for Stopping:         fluticasone-salmeterol (ADVAIR) 250-50 mcg/dose diskus inhaler Comments:   Reason for Stopping:         OTHER,NON-FORMULARY, Comments:   Reason for Stopping:               Activity: Activity as tolerated  Diet: Resume previous diet  Wound Care: None needed and     Follow-up Appointments   Procedures    FOLLOW UP VISIT Appointment in: 3 - 5 Days     Standing Status:   Standing     Number of Occurrences:   1     Order Specific Question:   Appointment in     Answer:   3 - 5 Days     40 minutes discharge time  Signed By: Viktor Ramos MD

## 2021-01-26 NOTE — PROGRESS NOTES
NEURO PROGRESS NOTE        S seizure-like activity, chronic kidney failure UBJECTIVE:         EXAM:  Awake, responds verbally, follows one-step commands repeated coaxing  Oriented to the month and year.   Monophasic  No seizures reported      ASSESSMENT/PLAN:  Current treatment continues    ALLERGIES:    Allergies   Allergen Reactions    Egg Nausea and Vomiting       MEDS:      Current Facility-Administered Medications:     acetaminophen (TYLENOL) tablet 650 mg, 650 mg, Oral, Q4H PRN **OR** acetaminophen (TYLENOL) solution 650 mg, 650 mg, Per NG tube, Q4H PRN **OR** acetaminophen (TYLENOL) suppository 650 mg, 650 mg, Rectal, Q4H PRN, Jaswant Lombardo MD    docusate sodium (COLACE) capsule 100 mg, 100 mg, Oral, BID, Jaswant Tanner MD, 100 mg at 01/24/21 0835    famotidine (PEPCID) tablet 20 mg, 20 mg, Oral, Q12H, Jaswant Tanner MD, 20 mg at 01/26/21 0900    acetaminophen (TYLENOL) tablet 500 mg, 500 mg, Oral, Q4HWA, Jaswant Tanner MD, 500 mg at 01/26/21 0910    albuterol (PROVENTIL HFA, VENTOLIN HFA, PROAIR HFA) inhaler 2 Puff, 2 Puff, Inhalation, Q4H PRN, Jaswant Lombardo MD    amiodarone (CORDARONE) tablet 400 mg, 400 mg, Oral, DAILY, Jaswant Tanner MD, 400 mg at 01/26/21 0910    atorvastatin (LIPITOR) tablet 10 mg, 10 mg, Oral, QHS, Jaswant Tanner MD, 10 mg at 01/25/21 2227    baclofen (LIORESAL) tablet 5 mg, 5 mg, Oral, TID, Jaswant Tanner MD, 5 mg at 01/26/21 0911    dilTIAZem ER (CARDIZEM CD) capsule 120 mg, 120 mg, Oral, DAILY, Jaswant Tanner MD, 120 mg at 01/26/21 0910    flecainide (TAMBOCOR) tablet 50 mg, 50 mg, Oral, BID, Jaswant Tanner MD, 50 mg at 01/26/21 0911    budesonide-formoteroL (SYMBICORT) 160-4.5 mcg/actuation HFA inhaler 2 Puff, 2 Puff, Inhalation, BID RT, Mat CLEMENTE MD, 2 Puff at 01/26/21 0821    losartan (COZAAR) tablet 50 mg, 50 mg, Oral, DAILY, Jaswant Tanner MD, 50 mg at 01/26/21 0910    rivaroxaban (XARELTO) tablet 20 mg, 20 mg, Oral, DAILY WITH DINNER, Mj Khalil MD, 20 mg at 01/25/21 1815    senna-docusate (PERICOLACE) 8.6-50 mg per tablet 1 Tab, 1 Tab, Oral, BID, Mat CLEMENTE MD, 1 Tab at 01/25/21 0934    sodium chloride (NS) flush 5-40 mL, 5-40 mL, IntraVENous, Q8H, Jaswant Tanner MD, 10 mL at 01/26/21 0600    sodium chloride (NS) flush 5-40 mL, 5-40 mL, IntraVENous, PRN, Jaswant Tanner MD    dextrose 5 % - 0.45% NaCl infusion, 75 mL/hr, IntraVENous, CONTINUOUS, Jaswant Tanner MD, Last Rate: 75 mL/hr at 01/26/21 0541, 75 mL/hr at 01/26/21 0541    acetaminophen (TYLENOL) solution 650 mg, 650 mg, Oral, Q4H PRN, Jose Luis NephJaswant auguste MD    bisacodyL (DULCOLAX) tablet 5 mg, 5 mg, Oral, DAILY PRN, Mat CLEMENTE MD, 5 mg at 01/22/21 1025    traMADoL (ULTRAM) tablet 50 mg, 50 mg, Oral, BID PRN, Mj Khalil MD, 50 mg at 01/26/21 7485    LABS:  Recent Results (from the past 24 hour(s))   SARS-COV-2    Collection Time: 01/25/21  3:15 PM   Result Value Ref Range    SARS-CoV-2 Please find results under separate order     COVID-19 RAPID TEST    Collection Time: 01/25/21  3:15 PM   Result Value Ref Range    Specimen source Nasopharyngeal      COVID-19 rapid test Not Detected Not Detected         Visit Vitals  BP (!) 159/102 (BP 1 Location: Left arm, BP Patient Position: Post activity; Head of bed elevated (Comment degrees))   Pulse 90   Temp 98.6 °F (37 °C)   Resp 18   Ht 5' 5\" (1.651 m)   Wt 89.7 kg (197 lb 12 oz)   SpO2 98%   BMI 32.91 kg/m²       Imaging:  MRI BRAIN WO CONT   Final Result   Impression:   1. Subacute lacunar infarct of the right corona radiata and possible punctate   subacute infarct of the splenium of the corpus callosum. 2.  Generalized volume loss, bifrontal and right parietal encephalomalacia,   chronic bilateral cerebellar infarcts, several chronic microhemorrhages, and   advanced chronic small vessel ischemic changes.    3.  Diminished left vertebral artery flow void which may be related to developmental hypoplasia versus age-indeterminate occlusion or flow-limiting   stenosis. CT HEAD WO CONT   Final Result   Impression:   1. No evidence of an acute intracranial abnormality. 2.  Multifocal chronic infarcts/encephalomalacia and evidence of chronic small   vessel ischemic changes.       XR CHEST SNGL V   Final Result

## 2021-01-26 NOTE — PROGRESS NOTES
CM received the following message from [employer] at [facility]:    Ms Janet Christie is APPROVED to come to [address] [place] [address]. Please setup transport pickup from [facility] at 12pm today. BEFORE transport pickup, please FAX or UPLOAD the following documents: DC Summary and DC Med list, AVS, MAR Report (showing last meds given) -- Fax #: [phone]. // Call report to [phone], Accepting MD: Dr Kevin Toscano, Patient is admitting to 5th floor at [address], [address] [place] [address] Thank you. Mildred Mendez [phone]. All requested documentation faxed per request. Nurse, Pal Del Rosario, and unit secretary, Lm Shah, notified and verbalized understanding. CM attempted to reach Ms. Ruby Azul (daughter [phone]) to notify her of the patients' acceptance and ability to admit to [address] today. CM received VM and left a HIPPA compliant message for a return call. CM met with the patient and explained that she is able to admit to [address] today and that they want transportation to be set up for 12pm. She verbalized understanding and stated that she would try to reach her daughter as well. Discharge checklist completed with nurse, Pal Del Rosario. CM contacted Dr. Michael Tena in reference to modifying DC order from SNF to IRF. IMM N/A due to this being an EMTALA transfer. Discharge plan of care/case management plan validated with provider discharge order.

## 2021-01-26 NOTE — PROGRESS NOTES
Bedside and Verbal shift change report given to Gregory Harris RN (oncoming nurse) by Lorie Valles LPN (offgoing nurse). Report included the following information SBAR, Kardex, MAR and Recent Results.

## 2021-01-26 NOTE — PROGRESS NOTES
Comprehensive Nutrition Assessment    Type and Reason for Visit: RD nutrition re-screen/LOS    Nutrition Recommendations/Plan:   Continue Regular diet  No eggs 2/2 allergy  Nursing to document % po meal and snack intake, BMs in I/Os    Nutrition Assessment:  Admitted for altered mental status, encephalopathy, stroke (cerebrum). Per pt and EMR, good appetite, consuming 100% of trays. Diet order - Regular; per SLP (1/25) diet advanced to regular texture with thin liquids. Labs and meds reviewed. Malnutrition Assessment:  Malnutrition Status:  No malnutrition      Nutrition Related Findings:  NFPE not performed, pt appears nourished. Pt denies chewing or swallowing difficulties; no n/v/c/d; last BM 1/26. No edema noted in EMR. Wounds:    None       Current Nutrition Therapies:  DIET REGULAR    Anthropometric Measures:  · Height:  5' 5\" (165.1 cm)  · Current Body Wt:  89.7 kg (197 lb 12 oz)   · Ideal Body Wt:  125 lbs:  158.2 %   · BMI Category:  Obese class 1 (BMI 30.0-34. 9)       Nutrition Diagnosis:   No nutrition diagnosis at this time     Nutrition Interventions:   Food and/or Nutrient Delivery: Continue current diet  Nutrition Education and Counseling: No recommendations at this time  Coordination of Nutrition Care: Continue to monitor while inpatient    Nutrition Monitoring and Evaluation:   Behavioral-Environmental Outcomes: None identified  Food/Nutrient Intake Outcomes: Food and nutrient intake  Physical Signs/Symptoms Outcomes: Weight    Discharge Planning:    No discharge needs at this time     Electronically signed by Rickie Valentine RD on 1/26/2021 at 12:00 PM    Contact:

## 2021-01-26 NOTE — ROUTINE PROCESS
Bedside and Verbal shift change report given to Gibson Clayton LPN (oncoming nurse) by Rexford Bernheim, (offgoing nurse). Report included the following information SBAR, Kardex, MAR, Accordion, Recent Results.

## 2021-01-26 NOTE — DISCHARGE INSTRUCTIONS
Patient Education        Taking Aspirin and Other Antiplatelets Safely: Care Instructions  Your Care Instructions     Aspirin and other antiplatelet medicines help prevent blood clots from forming. They can help some people lower their risk of a heart attack or stroke. But these medicines can also make you more likely to bleed. That's why it's important to talk to your doctor before you start taking aspirin every day. It's not right for everyone. And if you and your doctor decide these medicines are right for you, learn how to take them safely. If you take aspirin, be sure you know how to take it. Your doctor can tell you what dose to take and how often to take it. One low-dose aspirin is 81 milligrams (mg). But the dose for daily aspirin can range from 81 mg to 325 mg. If you take another antiplatelet, take it as prescribed. Follow-up care is a key part of your treatment and safety. Be sure to make and go to all appointments, and call your doctor if you are having problems. It's also a good idea to know your test results and keep a list of the medicines you take. How can you care for yourself at home? · Before you start to take daily aspirin or some other antiplatelet, tell your doctor all the medicines, vitamins, herbal products, and supplements you take. · Tell your doctors, dentist, and pharmacist that you take an antiplatelet. · Take your medicine as your doctor directs. Make sure that you understand exactly what your doctor wants you to do. If another doctor says to stop taking the medicine for any reason, talk to the doctor who prescribed it before you stop. · Take your medicine at the same time every day. · Do not chew or crush the coated or time-release forms of your medicine. · If you miss a dose, don't take an extra dose to make up for it. · Ask your doctor whether you can drink alcohol. And ask how much you can drink.  When you take an antiplatelet, drinking too much raises your risk for liver damage and stomach bleeding. · If you are pregnant, are breastfeeding, or plan to become pregnant, talk to your doctor about what medicines are safe. · Talk with your doctor before you take a pain medicine. Many pain medicines have aspirin. Too much aspirin can be harmful. · Wear medical alert jewelry. This lets others know that you take an antiplatelet. You can buy it at most drugstores. · Try to avoid injuries that might make you bleed. For example, be careful when you exercise and when you play sports. Make your home safe to reduce your risk of falling. When should you call for help? Call 911 anytime you think you may need emergency care. For example, call if:    · You have a sudden, severe headache that is different from past headaches. Call your doctor now or seek immediate medical care if:    · You have any abnormal bleeding, such as:  ? A nosebleed that you can't easily stop. ? Bloody or black stools, or rectal bleeding. ? Bloody or pink urine.     · You feel dizzy or lightheaded or feel like you may faint. Watch closely for changes in your health, and be sure to contact your doctor if you have any problems. Where can you learn more? Go to http://www.gray.com/  Enter N010 in the search box to learn more about \"Taking Aspirin and Other Antiplatelets Safely: Care Instructions. \"  Current as of: December 16, 2019               Content Version: 12.6  © 5633-8313 [employer]. Care instructions adapted under license by [employer] (which disclaims liability or warranty for this information). If you have questions about a medical condition or this instruction, always ask your healthcare professional. [address] any warranty or liability for your use of this information.

## 2021-01-26 NOTE — PROGRESS NOTES
I have reviewed discharge instructions with the patient. The patient verbalized understanding. Discharge instructions and medications discussed with patient and patient verbalized understanding. Patient c/o right ankle and left knee pain 5/10, but not requesting any pain medication at this time. Telemetry #63 discontinued and returned to telemetry monitor. IV left forearm discontinued. No pain, redness or edema noted to site. Patient discharged to [facility] via ambulance transport with belongings. Report called to Raymon Rolle LPN at [facility] ([phone]). Patient's daughter, Ruby Azul, notified of patients discharge.

## 2021-01-27 NOTE — PROGRESS NOTES
Physician Progress Note      PATIENT:               Luciana Moser  CSN #:                  [id number]  :                       1945  ADMIT DATE:       2021 5:08 PM  [address] [place] DATE:        2021 12:33 PM  RESPONDING  PROVIDER #:        Shiraz Bustamante MD          QUERY TEXT:    Pt admitted with Acute stroke and has encephalopathy documented. If possible, please document in progress notes and discharge summary further specificity regarding the type of encephalopathy:  The medical record reflects the following:  Risk Factors: increased Sodium, increased BUN and Creatinine  Clinical Indicators: AMS that returned to a normal  Treatment: IVF      Thank you. Anibal Mccord RN CDI, CCS  Ext [phone]  Options provided:  -- Metabolic encephalopathy  -- Toxic encephalopathy  -- Encephalopathy due to ***  -- Toxic metabolic encephalopathy  -- Other - I will add my own diagnosis  -- Disagree - Not applicable / Not valid  -- Disagree - Clinically unable to determine / Unknown  -- Refer to Clinical Documentation Reviewer    PROVIDER RESPONSE TEXT:    This patient has metabolic encephalopathy. Query created by: Jaycee Brown on 2021 12:20 PM      QUERY TEXT:    Patient admitted with New Stroke. Noted documentation of Neuro consult on  stating that patient has radiata acute infarct. and new subacute lacunar infarct Documented in Discharge Summary   If possible, please document in progress notes and discharge summary if you are evaluating and /or treating any of the following: The medical record reflects the following:  Risk Factors: Hx of stroke  Clinical Indicators: AMS  Treatment:  IVF, monitoring, Xarelto PO      Thank you.   Anibal Mccord RN CDI, CCS  Ext [phone]  Options provided:  -- radiata acute infarct confirmed and subacute lacunar infarct ruled out  -- subacute lacunar infarct  confirmed and  radiata acute infarct  ruled out  -- Other - I will add my own diagnosis  -- Disagree - Not applicable / Not valid  -- Disagree - Clinically unable to determine / Unknown  -- Refer to Clinical Documentation Reviewer    PROVIDER RESPONSE TEXT:    After study,subacute lacunar infarct confirmed and radiata acute infarct ruled out.     Query created by: Karri Gray on 1/25/2021 12:35 PM      Electronically signed by:  Rachel Lopez MD 1/26/2021 7:25 PM

## 2021-01-30 NOTE — PROCEDURES
[facility]  EEG    Name:  Misael Anders  MR#:  [id number]  :  1945  ACCOUNT #:  [de-identified]  DATE OF SERVICE:  2021    DIAGNOSIS:  Mental status changes. DESCRIPTION:  Recording is done digitally on computer. Electrodes are placed in a 10-20 international system. Initial coordinates and equipment calibration followed by biocalibration. Initial montages are bipolar montages. TECHNIQUE:  Tracing started with the patient drowsy. As the recording continues, the patient has a background frequency of about 6 Hz. She is hooked up to an EKG machine that shows a heart rate of 96. The background frequency of 6 Hz continues throughout. She is exposed to photic stimulation without any abnormality. Hyperventilation is not done. IMPRESSION:  This is an abnormal EEG showing diffusely slow background consistent with a diffuse cerebral disorder. In this case, the patient has mental status changes.       Kathy De La Garza MD      [id number]  D:  2021 13:10  T:  2021 22:07  JOB #:  [id number]

## 2021-02-15 ENCOUNTER — HOSPITAL ENCOUNTER (EMERGENCY)
Age: 76
Discharge: HOME | End: 2021-02-15
Attending: EMERGENCY MEDICINE
Payer: MEDICARE

## 2021-02-15 ENCOUNTER — APPOINTMENT (OUTPATIENT)
Dept: CT IMAGING | Age: 76
End: 2021-02-15
Attending: EMERGENCY MEDICINE
Payer: MEDICARE

## 2021-02-15 ENCOUNTER — APPOINTMENT (OUTPATIENT)
Dept: GENERAL RADIOLOGY | Age: 76
End: 2021-02-15
Attending: EMERGENCY MEDICINE
Payer: MEDICARE

## 2021-02-15 VITALS
TEMPERATURE: 98 F | HEIGHT: 64 IN | SYSTOLIC BLOOD PRESSURE: 142 MMHG | WEIGHT: 233 LBS | HEART RATE: 80 BPM | BODY MASS INDEX: 39.78 KG/M2 | RESPIRATION RATE: 18 BRPM | OXYGEN SATURATION: 99 % | DIASTOLIC BLOOD PRESSURE: 97 MMHG

## 2021-02-15 DIAGNOSIS — M25.562 ACUTE PAIN OF LEFT KNEE: Primary | ICD-10-CM

## 2021-02-15 PROCEDURE — 73562 X-RAY EXAM OF KNEE 3: CPT

## 2021-02-15 PROCEDURE — 70450 CT HEAD/BRAIN W/O DYE: CPT

## 2021-02-15 PROCEDURE — 74011250637 HC RX REV CODE- 250/637: Performed by: EMERGENCY MEDICINE

## 2021-02-15 PROCEDURE — 99283 EMERGENCY DEPT VISIT LOW MDM: CPT

## 2021-02-15 RX ORDER — HYDROCODONE BITARTRATE AND ACETAMINOPHEN 5; 325 MG/1; MG/1
1 TABLET ORAL
Status: COMPLETED | OUTPATIENT
Start: 2021-02-15 | End: 2021-02-15

## 2021-02-15 RX ADMIN — HYDROCODONE BITARTRATE AND ACETAMINOPHEN 1 TABLET: 5; 325 TABLET ORAL at 18:00

## 2021-02-15 NOTE — ED PROVIDER NOTES
EMERGENCY DEPARTMENT HISTORY AND PHYSICAL EXAM        Date: 2/15/2021  Patient Name: Rajinder Mclaughlin    History of Presenting Illness     Chief Complaint   Patient presents with    Knee Pain     History Provided By: Patient    HPI: Rajinder Mclaughlin, 77 y.o. female with history of arthritis, COPD, hypertension, and CVA who presents with falls and left knee pain. States that she had 3 accidental falls last night. She did hit her head once. She had no loss of consciousness and has no headache. She went to bed feeling well. This morning she woke up with left knee pain that is severe, constant, nonradiating, worse with ambulation and movement. PCP: Pinky Shi MD    Current Outpatient Medications   Medication Sig Dispense Refill    acetaminophen (TYLENOL) 500 mg tablet Take 1 Tab by mouth every four (4) hours (while awake). 30 Tab 0    albuterol (PROVENTIL HFA, VENTOLIN HFA, PROAIR HFA) 90 mcg/actuation inhaler Take 2 Puffs by inhalation every four (4) hours as needed for Wheezing. 1 Inhaler 0    baclofen 5 mg tab Take 5 mg by mouth three (3) times daily. 90 Tab 0    dilTIAZem ER (Cardizem CD) 120 mg capsule Take 1 Cap by mouth daily. 30 Cap 0    flecainide (TAMBOCOR) 50 mg tablet Take 1 Tab by mouth two (2) times a day. 60 Tab 0    fluticasone propion-salmeteroL (Advair Diskus) 100-50 mcg/dose diskus inhaler Take 1 Puff by inhalation two (2) times a day. 1 Inhaler 0    losartan (COZAAR) 50 mg tablet Take 1 Tab by mouth daily. 30 Tab 0    rivaroxaban (XARELTO) 20 mg tab tablet Take 1 Tab by mouth daily (with dinner). 30 Tab 0    bisacodyL (DULCOLAX) 5 mg EC tablet Take 1 Tab by mouth daily as needed for Constipation. 30 Tab 0    docusate sodium (COLACE) 100 mg capsule Take 1 Cap by mouth two (2) times a day for 90 days. 60 Cap 2    famotidine (PEPCID) 20 mg tablet Take 1 Tab by mouth every twelve (12) hours. 60 Tab 0    aspirin 81 mg chewable tablet Take 81 mg by mouth daily.       atorvastatin (LIPITOR) 20 mg tablet Take 10 mg by mouth nightly. Past History     Past Medical History:  Past Medical History:   Diagnosis Date    Arthritis     Chronic obstructive pulmonary disease ([facility] [address]     Chronic pain     Hypertension     Morbid obesity ([facility] [address]     Stroke [facility]) 2011    NO RESIDUAL       Past Surgical History:  Past Surgical History:   Procedure Laterality Date    HX ORTHOPAEDIC Left 2012       Family History:  Family History   Problem Relation Age of Onset    Cancer Mother         RECTAL    Hypertension Mother     Heart Disease Mother     Heart Attack Father     Cancer Sister         BONE    Hypertension Sister     Other Sister         ANEURYSM    Cancer Brother         THROAT    Anesth Problems Neg Hx        Social History:  Social History     Tobacco Use    Smoking status: Former Smoker     Packs/day: 1.50     Years: 15.00     Pack years: 22.50     Quit date: 2012     Years since quittin.1    Smokeless tobacco: Never Used   Substance Use Topics    Alcohol use: Yes     Comment: OCCASIONALLY    Drug use: No       Allergies: Allergies   Allergen Reactions    Egg Nausea and Vomiting       Review of Systems   Review of Systems   Constitutional: Negative for fever. HENT: Negative for congestion. Eyes: Negative for visual disturbance. Respiratory: Negative for shortness of breath. Cardiovascular: Negative for chest pain. Gastrointestinal: Negative for abdominal pain. Genitourinary: Negative for dysuria. Musculoskeletal: Negative for arthralgias. Skin: Negative for rash. Neurological: Negative for headaches. Physical Exam   General: No acute distress. Well-nourished. Skin: No rash. Head: Normocephalic. Atraumatic. Eye: No proptosis or conjunctival injections. Respiratory: No apparent respiratory distress. Gastrointestinal: Nondistended. Musculoskeletal: No obvious bony deformities. Tenderness to the left knee.   No erythema or swelling to the left knee. No warmth of the knee. Psychiatric: Cooperative. Appropriate mood and affect. Diagnostic Study Results     Labs -   No results found for this or any previous visit (from the past 24 hour(s)). Radiologic Studies -   CT HEAD WO CONT   Final Result      1. Chronic regions of encephalomalacia in the watershed distributions of both   hemispheres as detailed further above. Chronic bilateral cerebellar infarcts. Acute on chronic ischemic changes are not excluded. If there is high clinical   concern for an underlying ischemic event, followup with MRI may be of benefit   and is more sensitive for infarct in the acute phase. 2.  No acute intracranial trauma or hemorrhage. XR KNEE LT 3 V   Final Result        CT Results  (Last 48 hours)               02/15/21 1849  CT HEAD WO CONT Final result    Impression:      1. Chronic regions of encephalomalacia in the watershed distributions of both   hemispheres as detailed further above. Chronic bilateral cerebellar infarcts. Acute on chronic ischemic changes are not excluded. If there is high clinical   concern for an underlying ischemic event, followup with MRI may be of benefit   and is more sensitive for infarct in the acute phase. 2.  No acute intracranial trauma or hemorrhage. Narrative:  CT HEAD WO CONT     2/15/2021 6:49 PM; SRM         Indication: 77years old;  Female; falls, hit head;       Technique: Noncontrast CT of the head was obtained according to standard   protocol. Comparison: 1/21/2021 MRI; 1/20/2021 head CT       Dose reduction: All CT scans at this facility are performed using dose reduction   optimization techniques as appropriate to the performed exam including the   following: Automatic exposure control; adjustment of the mA and/or kV according   to patient size; or the use of reconstruction techniques.         Findings:       Chronic distribution regions of encephalomalacia in the right frontal and right   frontoparietal regions are evident. Watershed distribution encephalomalacia   contralaterally in the left frontal region is evident. Chronic infarcts in both   cerebellar hemispheres. These findings appear similar in pattern and   distribution to prior exams. Global atrophy. Ventricles and cisterns are otherwise patent. No hemorrhage   edema, hydrocephalus, or midline shift. The native lenses are absent. The imaged paranasal sinuses are well-aerated. The   mastoid air cells are well-aerated. The calvarium is intact. No osseous   abnormalities are evident. There are atherosclerotic arterial calcifications. CXR Results  (Last 48 hours)    None          Medical Decision Making and ED Course     I reviewed the available vital signs, nursing notes, past medical history, past surgical history, family history, and social history. Vital Signs - Reviewed the patient's vital signs. Patient Vitals for the past 12 hrs:   Temp Pulse Resp BP SpO2   02/15/21 1607 98 °F (36.7 °C) 80 18 (!) 142/97 99 %       Medical Decision Making:   Presented with left knee pain and falls. The differential diagnosis is head injury, neck injury, knee injury, arthritis, knee effusion. X-ray shows no significant abnormalities. I did do a CT head since patient had a few falls yesterday. CT head shows no intracranial injury. There are signs of possible subacute versus old strokes. Patient states that she did know that she had a stroke recently and is following with a neurologist.  She has no new symptoms or symptoms consistent with a stroke at this time. Discharged with return precautions. I did give her Norco here. She has tramadol at home for pain control. Disposition     Discharged      DISCHARGE PLAN:  1.    Current Discharge Medication List      CONTINUE these medications which have NOT CHANGED    Details   acetaminophen (TYLENOL) 500 mg tablet Take 1 Tab by mouth every four (4) hours (while awake). Qty: 30 Tab, Refills: 0      albuterol (PROVENTIL HFA, VENTOLIN HFA, PROAIR HFA) 90 mcg/actuation inhaler Take 2 Puffs by inhalation every four (4) hours as needed for Wheezing. Qty: 1 Inhaler, Refills: 0      baclofen 5 mg tab Take 5 mg by mouth three (3) times daily. Qty: 90 Tab, Refills: 0      dilTIAZem ER (Cardizem CD) 120 mg capsule Take 1 Cap by mouth daily. Qty: 30 Cap, Refills: 0      flecainide (TAMBOCOR) 50 mg tablet Take 1 Tab by mouth two (2) times a day. Qty: 60 Tab, Refills: 0      fluticasone propion-salmeteroL (Advair Diskus) 100-50 mcg/dose diskus inhaler Take 1 Puff by inhalation two (2) times a day. Qty: 1 Inhaler, Refills: 0      losartan (COZAAR) 50 mg tablet Take 1 Tab by mouth daily. Qty: 30 Tab, Refills: 0      rivaroxaban (XARELTO) 20 mg tab tablet Take 1 Tab by mouth daily (with dinner). Qty: 30 Tab, Refills: 0      bisacodyL (DULCOLAX) 5 mg EC tablet Take 1 Tab by mouth daily as needed for Constipation. Qty: 30 Tab, Refills: 0      docusate sodium (COLACE) 100 mg capsule Take 1 Cap by mouth two (2) times a day for 90 days. Qty: 60 Cap, Refills: 2      famotidine (PEPCID) 20 mg tablet Take 1 Tab by mouth every twelve (12) hours. Qty: 60 Tab, Refills: 0      aspirin 81 mg chewable tablet Take 81 mg by mouth daily. atorvastatin (LIPITOR) 20 mg tablet Take 10 mg by mouth nightly. 2.   Follow-up Information     Follow up With Specialties Details Why [address] EMERGENCY DEPT Emergency Medicine Go today As soon as possible if symptoms worsen [address] [facility] [address] [phone]    Primary care doctor  Schedule an appointment as soon as possible for a visit in 3 days      Elham Posey MD Orthopedic Surgery Schedule an appointment as soon as possible for a visit in 3 days This is an orthopedic surgeon [facility] [place]  [phone]          3.   Return to ED if worse Diagnosis     Clinical impression:   1. Acute pain of left knee           Attestation:  Please note that this dictation was completed with [employer], the computer voice recognition software. Quite often unanticipated grammatical, syntax, homophones, and other interpretive errors are inadvertently transcribed by the computer software. Please disregard these errors. Please excuse any errors that have escaped final proofreading. Thank you.   Eren Joseph, DO

## 2021-02-15 NOTE — ED TRIAGE NOTES
Pt complains of chronic L knee pain, worsened last night and caused her to have 3 falls. Pt did not hit head, only complains of knee pain and inability to ambulate.

## 2021-02-16 NOTE — ROUTINE PROCESS
Assisted pt in vehicle, daughter of pt given verbal instructions, pt was able to stand and pivot in vehicle, daughter verbally abusive about mother discharged home

## 2021-02-16 NOTE — ED NOTES
Pt insists her adult diaper was wet, new adult diaper applied, pt then insisted to remove her pajama pants, pant were dry, but pt wanted pants removed, placed in a belonging bag, pt discharged home, pt callinf daughter to provide transportation home

## 2022-02-10 ENCOUNTER — HOSPITAL ENCOUNTER (EMERGENCY)
Age: 77
Discharge: NURSING, REHAB OR LONG-TERM CARE | End: 2022-02-10
Attending: EMERGENCY MEDICINE | Admitting: EMERGENCY MEDICINE
Payer: MEDICARE

## 2022-02-10 ENCOUNTER — APPOINTMENT (OUTPATIENT)
Dept: CT IMAGING | Age: 77
End: 2022-02-10
Attending: STUDENT IN AN ORGANIZED HEALTH CARE EDUCATION/TRAINING PROGRAM
Payer: MEDICARE

## 2022-02-10 VITALS
TEMPERATURE: 98.3 F | HEIGHT: 64 IN | BODY MASS INDEX: 36.7 KG/M2 | RESPIRATION RATE: 18 BRPM | OXYGEN SATURATION: 97 % | HEART RATE: 90 BPM | WEIGHT: 215 LBS | DIASTOLIC BLOOD PRESSURE: 86 MMHG | SYSTOLIC BLOOD PRESSURE: 138 MMHG
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
## 2022-02-10 DIAGNOSIS — S00.83XA FACIAL HEMATOMA, INITIAL ENCOUNTER: ICD-10-CM

## 2022-02-10 DIAGNOSIS — S06.0X0A CONCUSSION WITHOUT LOSS OF CONSCIOUSNESS, INITIAL ENCOUNTER: ICD-10-CM

## 2022-02-10 LAB
ALBUMIN SERPL-MCNC: 2.4 G/DL (ref 3.5–5)
ALBUMIN/GLOB SERPL: 0.5 {RATIO} (ref 1.1–2.2)
ALP SERPL-CCNC: 100 U/L (ref 45–117)
ALT SERPL-CCNC: 43 U/L (ref 12–78)
ANION GAP SERPL CALC-SCNC: 8 MMOL/L (ref 5–15)
AST SERPL W P-5'-P-CCNC: 35 U/L (ref 15–37)
BASOPHILS # BLD: 0.1 K/UL (ref 0–0.1)
BASOPHILS NFR BLD: 1 % (ref 0–1)
BILIRUB SERPL-MCNC: 0.4 MG/DL (ref 0.2–1)
BUN SERPL-MCNC: 14 MG/DL (ref 6–20)
BUN/CREAT SERPL: 13 (ref 12–20)
CA-I BLD-MCNC: 9.4 MG/DL (ref 8.5–10.1)
CHLORIDE SERPL-SCNC: 114 MMOL/L (ref 97–108)
CO2 SERPL-SCNC: 19 MMOL/L (ref 21–32)
CREAT SERPL-MCNC: 1.1 MG/DL (ref 0.55–1.02)
DIFFERENTIAL METHOD BLD: ABNORMAL
EOSINOPHIL # BLD: 0.2 K/UL (ref 0–0.4)
EOSINOPHIL NFR BLD: 3 % (ref 0–7)
ERYTHROCYTE [DISTWIDTH] IN BLOOD BY AUTOMATED COUNT: 16.2 % (ref 11.5–14.5)
GLOBULIN SER CALC-MCNC: 5.3 G/DL (ref 2–4)
GLUCOSE SERPL-MCNC: 76 MG/DL (ref 65–100)
HCT VFR BLD AUTO: 43.4 % (ref 35–47)
HGB BLD-MCNC: 13.7 G/DL (ref 11.5–16)
IMM GRANULOCYTES # BLD AUTO: 0 K/UL
IMM GRANULOCYTES NFR BLD AUTO: 0 %
INR PPP: 1.8 (ref 0.9–1.1)
LYMPHOCYTES # BLD: 2 K/UL (ref 0.8–3.5)
LYMPHOCYTES NFR BLD: 37 % (ref 12–49)
MCH RBC QN AUTO: 28.3 PG (ref 26–34)
MCHC RBC AUTO-ENTMCNC: 31.6 G/DL (ref 30–36.5)
MCV RBC AUTO: 89.7 FL (ref 80–99)
MONOCYTES # BLD: 0.8 K/UL (ref 0–1)
MONOCYTES NFR BLD: 15 % (ref 5–13)
NEUTS SEG # BLD: 2.4 K/UL (ref 1.8–8)
NEUTS SEG NFR BLD: 44 % (ref 32–75)
NRBC # BLD: 0 K/UL (ref 0–0.01)
NRBC BLD-RTO: 0 PER 100 WBC
PLATELET # BLD AUTO: 500 K/UL (ref 150–400)
PMV BLD AUTO: 10.8 FL (ref 8.9–12.9)
POTASSIUM SERPL-SCNC: 3.8 MMOL/L (ref 3.5–5.1)
PROT SERPL-MCNC: 7.7 G/DL (ref 6.4–8.2)
PROTHROMBIN TIME: 20 SEC (ref 11.9–14.6)
RBC # BLD AUTO: 4.84 M/UL (ref 3.8–5.2)
RBC MORPH BLD: ABNORMAL
SODIUM SERPL-SCNC: 141 MMOL/L (ref 136–145)
WBC # BLD AUTO: 5.5 K/UL (ref 3.6–11)

## 2022-02-10 PROCEDURE — 36415 COLL VENOUS BLD VENIPUNCTURE: CPT

## 2022-02-10 PROCEDURE — 70450 CT HEAD/BRAIN W/O DYE: CPT

## 2022-02-10 PROCEDURE — 99285 EMERGENCY DEPT VISIT HI MDM: CPT

## 2022-02-10 PROCEDURE — 85025 COMPLETE CBC W/AUTO DIFF WBC: CPT

## 2022-02-10 PROCEDURE — 85610 PROTHROMBIN TIME: CPT

## 2022-02-10 PROCEDURE — 80053 COMPREHEN METABOLIC PANEL: CPT

## 2022-02-10 NOTE — ED PROVIDER NOTES
EMERGENCY DEPARTMENT HISTORY AND PHYSICAL EXAM        Date: 2/10/2022  Patient Name: Alba Monreal    History of Presenting Illness     Chief Complaint   Patient presents with    Fall       History Provided By: Patient and EMS    HPI: Alba Monreal, 77 y.o. female with history of hypertension, stroke who takes warfarin who presents with ground-level fall. States that she was going to the bathroom this morning and slipped. She landed hitting her head on the ground. No loss of consciousness. Has mild pain in the left frontal area of her head. No neck pain. No other injuries. PCP: Lysbeth Angelucci, MD    Current Outpatient Medications   Medication Sig Dispense Refill    acetaminophen (TYLENOL) 500 mg tablet Take 1 Tab by mouth every four (4) hours (while awake). 30 Tab 0    albuterol (PROVENTIL HFA, VENTOLIN HFA, PROAIR HFA) 90 mcg/actuation inhaler Take 2 Puffs by inhalation every four (4) hours as needed for Wheezing. 1 Inhaler 0    baclofen 5 mg tab Take 5 mg by mouth three (3) times daily. 90 Tab 0    dilTIAZem ER (Cardizem CD) 120 mg capsule Take 1 Cap by mouth daily. 30 Cap 0    flecainide (TAMBOCOR) 50 mg tablet Take 1 Tab by mouth two (2) times a day. 60 Tab 0    fluticasone propion-salmeteroL (Advair Diskus) 100-50 mcg/dose diskus inhaler Take 1 Puff by inhalation two (2) times a day. 1 Inhaler 0    losartan (COZAAR) 50 mg tablet Take 1 Tab by mouth daily. 30 Tab 0    rivaroxaban (XARELTO) 20 mg tab tablet Take 1 Tab by mouth daily (with dinner). 30 Tab 0    bisacodyL (DULCOLAX) 5 mg EC tablet Take 1 Tab by mouth daily as needed for Constipation. 30 Tab 0    famotidine (PEPCID) 20 mg tablet Take 1 Tab by mouth every twelve (12) hours. 60 Tab 0    aspirin 81 mg chewable tablet Take 81 mg by mouth daily.  atorvastatin (LIPITOR) 20 mg tablet Take 10 mg by mouth nightly.          Past History     Past Medical History:  Past Medical History:   Diagnosis Date    Arthritis     Chronic obstructive pulmonary disease (HCC)     Chronic pain     Hypertension     Morbid obesity ([address]     Stroke [facility]) 2011    NO RESIDUAL       Past Surgical History:  Past Surgical History:   Procedure Laterality Date    HX ORTHOPAEDIC Left 2012       Family History:  Family History   Problem Relation Age of Onset    Cancer Mother         RECTAL    Hypertension Mother     Heart Disease Mother     Heart Attack Father     Cancer Sister         BONE    Hypertension Sister     Other Sister         ANEURYSM    Cancer Brother         THROAT    Anesth Problems Neg Hx        Social History:  Social History     Tobacco Use    Smoking status: Former Smoker     Packs/day: 1.50     Years: 15.00     Pack years: 22.50     Quit date: 2012     Years since quittin.1    Smokeless tobacco: Never Used   Substance Use Topics    Alcohol use: Yes     Comment: OCCASIONALLY    Drug use: No       Allergies: Allergies   Allergen Reactions    Egg Nausea and Vomiting     Reviewed and noncontributory    Review of Systems   Review of Systems   Constitutional: Negative for fever. HENT: Negative for congestion. Eyes: Negative for visual disturbance. Respiratory: Negative for shortness of breath. Cardiovascular: Negative for chest pain. Gastrointestinal: Negative for abdominal pain. Genitourinary: Negative for dysuria. Musculoskeletal: Negative for arthralgias. Skin: Negative for rash. Neurological: Positive for headaches. Physical Exam   Constitutional: No acute distress. Well-nourished. Skin: No rash. ENT: No rhinorrhea. No cough. Hematoma to the left frontal scalp above the left eye. Eye: No proptosis or conjunctival injections. Extraocular eye movements are intact. Respiratory: No apparent respiratory distress. Gastrointestinal: Nondistended. Musculoskeletal: No obvious bony deformities. No midline spinal tenderness to the cervical spine.   No significant musculoskeletal tenderness to the extremities, clavicles, ribs. Psychiatric: Cooperative. Appropriate mood and affect. Diagnostic Study Results     Labs -     Recent Results (from the past 24 hour(s))   CBC WITH AUTOMATED DIFF    Collection Time: 02/10/22  7:14 AM   Result Value Ref Range    WBC 5.5 3.6 - 11.0 K/uL    RBC 4.84 3.80 - 5.20 M/uL    HGB 13.7 11.5 - 16.0 g/dL    HCT 43.4 35.0 - 47.0 %    MCV 89.7 80.0 - 99.0 FL    MCH 28.3 26.0 - 34.0 PG    MCHC 31.6 30.0 - 36.5 g/dL    RDW 16.2 (H) 11.5 - 14.5 %    PLATELET 422 (H) 890 - 400 K/uL    MPV 10.8 8.9 - 12.9 FL    NRBC 0.0 0.0  WBC    ABSOLUTE NRBC 0.00 0.00 - 0.01 K/uL    NEUTROPHILS PENDING %    LYMPHOCYTES PENDING %    MONOCYTES PENDING %    EOSINOPHILS PENDING %    BASOPHILS PENDING %    IMMATURE GRANULOCYTES PENDING %    ABS. NEUTROPHILS PENDING K/UL    ABS. LYMPHOCYTES PENDING K/UL    ABS. MONOCYTES PENDING K/UL    ABS. EOSINOPHILS PENDING K/UL    ABS. BASOPHILS PENDING K/UL    ABS. IMM. GRANS. PENDING K/UL    DF PENDING    PROTHROMBIN TIME + INR    Collection Time: 02/10/22  7:14 AM   Result Value Ref Range    Prothrombin time 20.0 (H) 11.9 - 14.6 sec    INR 1.8 (H) 0.9 - 1.1     METABOLIC PANEL, COMPREHENSIVE    Collection Time: 02/10/22  8:15 AM   Result Value Ref Range    Sodium 141 136 - 145 mmol/L    Potassium 3.8 3.5 - 5.1 mmol/L    Chloride 114 (H) 97 - 108 mmol/L    CO2 19 (L) 21 - 32 mmol/L    Anion gap 8 5 - 15 mmol/L    Glucose 76 65 - 100 mg/dL    BUN 14 6 - 20 mg/dL    Creatinine 1.10 (H) 0.55 - 1.02 mg/dL    BUN/Creatinine ratio 13 12 - 20      GFR est AA 59 (L) >60 ml/min/1.73m2    GFR est non-AA 48 (L) >60 ml/min/1.73m2    Calcium 9.4 8.5 - 10.1 mg/dL    Bilirubin, total 0.4 0.2 - 1.0 mg/dL    AST (SGOT) 35 15 - 37 U/L    ALT (SGPT) 43 12 - 78 U/L    Alk.  phosphatase 100 45 - 117 U/L    Protein, total 7.7 6.4 - 8.2 g/dL    Albumin 2.4 (L) 3.5 - 5.0 g/dL    Globulin 5.3 (H) 2.0 - 4.0 g/dL    A-G Ratio 0.5 (L) 1.1 - 2.2         Radiologic Studies -   CT HEAD WO CONT   Final Result   1. Left periorbital bruising with no underlying orbital wall fracture or acute   intracranial findings. 2. Stable encephalomalacia        CT Results  (Last 48 hours)               02/10/22 0741  CT HEAD WO CONT Final result    Impression:  1. Left periorbital bruising with no underlying orbital wall fracture or acute   intracranial findings. 2. Stable encephalomalacia       Narrative:  Axial images from the skull base to the vertex were obtained without the use of   contrast. Bone windows were reviewed. Sagittal and coronal reformatted images   were also reviewed. All CT scans at this facility are performed using dose   reduction optimization techniques as appropriate to a performed exam including   the following:   automated exposure control, adjustments of the mA and/or kV   according to patient size, or use of iterative reconstruction technique. Comparison with February 15, 2021. INDICATION: Ground-level fall. There is minimal bruising just above the left orbit. No adjacent fracture. There   is no evidence of hemorrhage. No mass or mass effect is seen. There is no acute   ischemia. There is stable encephalomalacia involving the right frontoparietal region,   posterior right parietal region and left frontal lobe. Old ischemic changes   involving the cerebellum bilaterally are noted. Periventricular and subcortical   white matter changes appear stable. Bone windows show no acute abnormalities. The visualized portions of the orbits,   paranasal sinuses and mastoid air cells are unremarkable. Review of the   reconstructed images show no additional findings. CXR Results  (Last 48 hours)    None          Medical Decision Making and ED Course     I reviewed the available vital signs, nursing notes, past medical history, past surgical history, family history, and social history. Vital Signs - Reviewed the patient's vital signs.   Patient Vitals for the past 12 hrs:   Temp Pulse Resp BP SpO2   02/10/22 0730     96 %   02/10/22 0642 98.1 °F (36.7 °C) 96 20 (!) 140/90 96 %     Medical Decision Making:   Presented with headache after fall hitting head. The differential diagnosis is concussion, intracranial hemorrhage, contusion, hematoma. Negative CT scan. Does have hematoma. May have acute concussion. Recommended precautions. Recommended follow-up return precautions. Discharged home. Procedures     Critical Care Note:   7:16 AM  Amount of critical care time: 30 minutes  Impending deterioration: CNS  Associated risk factors: Trauma  Management: Bedside Assessment and Supervision of Care  Interpretation: CT Scan  Interventions: Observation  Case review: N/A  Treatment response: Stable  Performed by: Self  Notes: I have spent critical care time involved in lab review, decision making, bedside attention, and documentation. This time excludes time spent in any separate billed procedures. During this entire length of time I was immediately available to the patient. Florinda Soares,     Disposition     Discharged      DISCHARGE PLAN:  1. Current Discharge Medication List      CONTINUE these medications which have NOT CHANGED    Details   acetaminophen (TYLENOL) 500 mg tablet Take 1 Tab by mouth every four (4) hours (while awake). Qty: 30 Tab, Refills: 0      albuterol (PROVENTIL HFA, VENTOLIN HFA, PROAIR HFA) 90 mcg/actuation inhaler Take 2 Puffs by inhalation every four (4) hours as needed for Wheezing. Qty: 1 Inhaler, Refills: 0      baclofen 5 mg tab Take 5 mg by mouth three (3) times daily. Qty: 90 Tab, Refills: 0      dilTIAZem ER (Cardizem CD) 120 mg capsule Take 1 Cap by mouth daily. Qty: 30 Cap, Refills: 0      flecainide (TAMBOCOR) 50 mg tablet Take 1 Tab by mouth two (2) times a day.   Qty: 60 Tab, Refills: 0      fluticasone propion-salmeteroL (Advair Diskus) 100-50 mcg/dose diskus inhaler Take 1 Puff by inhalation two (2) times a day. Qty: 1 Inhaler, Refills: 0      losartan (COZAAR) 50 mg tablet Take 1 Tab by mouth daily. Qty: 30 Tab, Refills: 0      rivaroxaban (XARELTO) 20 mg tab tablet Take 1 Tab by mouth daily (with dinner). Qty: 30 Tab, Refills: 0      bisacodyL (DULCOLAX) 5 mg EC tablet Take 1 Tab by mouth daily as needed for Constipation. Qty: 30 Tab, Refills: 0      famotidine (PEPCID) 20 mg tablet Take 1 Tab by mouth every twelve (12) hours. Qty: 60 Tab, Refills: 0      aspirin 81 mg chewable tablet Take 81 mg by mouth daily. atorvastatin (LIPITOR) 20 mg tablet Take 10 mg by mouth nightly. 2.   Follow-up Information     Follow up With Specialties Details Why [address] [place] [address] EMERGENCY DEPT Emergency Medicine Go today As soon as possible if symptoms worsen [facility] [place] [address]  [phone]    Primary care doctor  Schedule an appointment as soon as possible for a visit in 3 days          3. Return to ED if worse     Diagnosis     Clinical impression:   1. Fall, initial encounter    2. Facial hematoma, initial encounter    3. Concussion without loss of consciousness, initial encounter           Attestation:  Please note that this dictation was completed with [employer], the computer voice recognition software. Quite often unanticipated grammatical, syntax, homophones, and other interpretive errors are inadvertently transcribed by the computer software. Please disregard these errors. Please excuse any errors that have escaped final proofreading. Thank you.   Faisal Antunez, DO

## 2022-02-10 NOTE — ED NOTES
Pt is a/ox3, denies LOC when she fell trying to walk to bathroom unassisted and wo walker. Pt fell at [facility] facility. Pt has small hematoma over left eyebrow which she states is tender to palp.

## 2022-02-10 NOTE — ED NOTES
Report called to [facility] on [facility] unit at [facility] system. SBAR,Labs and VS given in report.

## 2022-02-10 NOTE — ED TRIAGE NOTES
Patient arrived via EMS. Patient from [facility] health [facility] rehab. Patient had a GLF due to a slippery floor, patient did not lose consciousness, patient is on blood thinners.

## 2022-03-18 PROBLEM — R41.82 AMS (ALTERED MENTAL STATUS): Status: ACTIVE | Noted: 2021-01-20

## 2022-03-19 PROBLEM — I63.9 STROKE (CEREBRUM) (HCC): Status: ACTIVE | Noted: 2021-01-22

## 2022-03-20 PROBLEM — G93.40 ENCEPHALOPATHY: Status: ACTIVE | Noted: 2021-01-21

## 2023-02-13 NOTE — CONSULTS
Consult Date: 2021    Consults Ms. Bailee Ribeiro is a 77year old woman with AMS now improved. She was found to have a punctate stroke abutting the left occipital horn. Stroke workup complete and unrevealing.      Subjective     Past Medical History:   Diagnosis Date    Arthritis     [address] obstructive [facility] disease [address]     Chronic pain     Hypertension     Morbid obesity ([facility]     Stroke [facility]) 2011    NO RESIDUAL      Past Surgical History:   Procedure Laterality Date    HX ORTHOPAEDIC Left 2012     Family History   Problem Relation Age of Onset    Cancer Mother         RECTAL    Hypertension Mother     Heart Disease Mother     Heart Attack Father     Cancer Sister         BONE    Hypertension Sister     Other Sister         ANEURYSM    Cancer Brother         THROAT    Anesth Problems Neg Hx       Social History     Tobacco Use    Smoking status: Former Smoker     Packs/day: 1.50     Years: 15.00     Pack years: 22.50     Quit date: 2012     Years since quittin.0    Smokeless tobacco: Never Used   Substance Use Topics    Alcohol use: Yes     Comment: OCCASIONALLY       Current Facility-Administered Medications   Medication Dose Route Frequency Provider Last Rate Last Admin    acetaminophen (TYLENOL) tablet 650 mg  650 mg Oral Q4H PRN Helder CLEMENTE MD        Or    acetaminophen (TYLENOL) solution 650 mg  650 mg Per NG tube Q4H PRN Helder CLEMENTE MD        Or    acetaminophen (TYLENOL) suppository 650 mg  650 mg Rectal Q4H PRN Helder CLEMENTE MD        docusate sodium (COLACE) capsule 100 mg  100 mg Oral BID Helder CLEMENTE MD   100 mg at 21 0852    famotidine (PEPCID) tablet 20 mg  20 mg Oral Q12H Helder CLEMENTE MD   20 mg at 21 0852    acetaminophen (TYLENOL) tablet 500 mg  500 mg Oral Q4HWA Helder CLEMENTE MD   500 mg at 21 0957    albuterol (PROVENTIL HFA, VENTOLIN HFA, PROAIR HFA) inhaler 2 Puff  2 Puff Inhalation Q4H PRN PCP: Christiano Rock MD    Last appt: 1/19/2023  Future Appointments   Date Time Provider Bhavesh Mckoy   4/17/2023 10:00 AM Christiano Rock MD [facility] AM[facility]       Requested Prescriptions     Pending Prescriptions Disp Refills    pravastatin (PRAVACHOL) 20 mg tablet 90 Tablet 3     Sig: Take 1 Tablet by mouth nightly. Caridad Kirkpatrick MD        amiodarone (CORDARONE) tablet 400 mg  400 mg Oral DAILY Arjun CLEMENTE MD   400 mg at 01/23/21 8965    atorvastatin (LIPITOR) tablet 10 mg  10 mg Oral QHS Arjun CLEMENTE MD   10 mg at 01/22/21 2121    baclofen (LIORESAL) tablet 5 mg  5 mg Oral TID Arjun CLEMENTE MD   5 mg at 01/23/21 0853    dilTIAZem ER (CARDIZEM CD) capsule 120 mg  120 mg Oral DAILY Caridad Kirkpatrick MD   120 mg at 01/23/21 0854    flecainide (TAMBOCOR) tablet 50 mg  50 mg Oral BID Arjun CLEMENTE MD   50 mg at 01/23/21 0272    budesonide-formoteroL (SYMBICORT) 160-4.5 mcg/actuation HFA inhaler 2 Puff  2 Puff Inhalation BID RT Caridad Kirkpatrick MD   2 Puff at 01/23/21 0955    losartan (COZAAR) tablet 50 mg  50 mg Oral DAILY Caridad Kirkpatrick MD   50 mg at 01/23/21 9419    rivaroxaban (XARELTO) tablet 20 mg  20 mg Oral DAILY WITH David CLEMENTE MD   20 mg at 01/21/21 1602    senna-docusate (PERICOLACE) 8.6-50 mg per tablet 1 Tab  1 Tab Oral BID Caridad Kirkpatrick MD   1 Tab at 01/23/21 0852    sodium chloride (NS) flush 5-40 mL  5-40 mL IntraVENous Q8H Caridad Kirkpatrick MD   10 mL at 01/23/21 0609    sodium chloride (NS) flush 5-40 mL  5-40 mL IntraVENous PRN Arjun CLEMENTE MD        dextrose 5 % - 0.45% NaCl infusion  75 mL/hr IntraVENous CONTINUOUS Caridad Kirkpatrick MD 75 mL/hr at 01/23/21 0607 75 mL/hr at 01/23/21 0607    acetaminophen (TYLENOL) solution 650 mg  650 mg Oral Q4H PRN Arjun CLEMENTE MD        bisacodyL (DULCOLAX) tablet 5 mg  5 mg Oral DAILY PRN Arjun CLEMENTE MD   5 mg at 01/22/21 1025    traMADoL (ULTRAM) tablet 50 mg  50 mg Oral BID PRN Caridad Kirkpatrick MD   50 mg at 01/22/21 1024        Review of Systems   All other systems reviewed and are negative.       Objective     Vital signs for last 24 hours:  Visit Vitals  BP (!) 153/95   Pulse 98   Temp 98.4 °F (36.9 °C)   Resp 18   Ht 5' 5\" (1.651 m)   Wt 93.2 kg (205 lb 7.5 oz)   SpO2 99%   BMI 34.19 kg/m² Intake/Output this shift:  Current Shift: No intake/output data recorded. Last 3 Shifts: No intake/output data recorded.     Data Review:   Recent Results (from the past 24 hour(s))   ECHO ADULT COMPLETE    Collection Time: 01/22/21  3:20 PM   Result Value Ref Range    Aortic Regurgitant Pressure Half-time 531.00 ms    AR Max Navdeep 377.00 cm/s    Pulmonic Regurgitant End Max Velocity 165.00 cm/s    AoV PG 11.00 mmHg    IVSd 1.13 (A) 0.6 - 0.9 cm    LVIDd 3.73 (A) 3.9 - 5.3 cm    LVIDs 2.19 cm    Pulmonic Regurgitant End Max Velocity 112.00 cm/s    LVOT Peak Gradient 5.00 mmHg    LVPWd 1.05 (A) 0.6 - 0.9 cm    LV ED Vol A2C 51.90 cm3    BP EF 73.0 55 - 100 %    LV ES Vol A2C 10.50 cm3    Pulmonic Regurgitant End Max Velocity 534.00 cm/s    Mitral Valve Deceleration [place] 6,980.00 mm/s2    Mitral Valve Deceleration [place] 6,980.00 mm/s2    Mitral Valve E Wave Deceleration Time 342.00 ms    Mitral Valve Pressure Half-time 91.00 ms    MV A Navdeep 70.30 cm/s    MV E Navdeep 166.00 cm/s    MVA (PHT) 2.42 cm2    MV E/A 2.36     Pulmonic Regurgitant End Max Velocity 89.00 cm/s    Pulmonic Valve Systolic Peak Instantaneous Gradient 3.00 mmHg    Pulmonic Regurgitant End Max Velocity 143.00 cm/s    Pulmonic Valve Systolic Peak Instantaneous Gradient 8.00 mmHg    P Vein A Dur 127.00 ms    Pulmonary Vein \"A\" Wave Velocity 31.60 cm/s    Est. RA Pressure 3.00 mmHg    RVIDd 2.61 cm    RVSP 48.00 mmHg    Tricuspid Valve Max Velocity 337.00 cm/s    Triscuspid Valve Regurgitation Peak Gradient 45.00 mmHg    Right Atrial Area 4C 18.94 cm2    LA Area 4C 33.47 cm2    LV Mass .2 67 - 162 g    LV Mass AL Index 65.3 43 - 95 g/m2    Left Atrium Minor Axis 2.17 cm    Left Atrium Major Axis 4.30 cm    Ao Root D 3.30 cm   DUPLEX CAROTID BILATERAL    Collection Time: 01/22/21  3:37 PM   Result Value Ref Range    Left CCA dist sys 71.4 cm/s    Left CCA dist mcknight 14.5 cm/s    Left CCA prox sys 67.1 cm/s    Left CCA prox mcknight 13.4 cm/s    Left ICA dist sys 41.8 cm/s    Left ICA dist mcknight 16.3 cm/s    Left ICA prox sys 55.9 cm/s    Left ICA prox mcknight 14.0 cm/s    Left ECA sys 69.3 cm/s    LEFT EXTERNAL CAROTID ARTERY D 10.20 cm/s    Left subclavian sys 49.4 cm/s    LEFT SUBCLAVIAN ARTERY D 0.00 cm/s    Left vertebral sys 40.8 cm/s    LEFT VERTEBRAL ARTERY D 0.00 cm/s    Right cca dist sys 47.8 cm/s    Right CCA dist mcknight 11.3 cm/s    Right CCA prox sys 54.8 cm/s    Right CCA prox mcknight 10.2 cm/s    Right ICA dist sys 69.3 cm/s    Right ICA dist mcknight 19.3 cm/s    Right ICA prox sys 43.0 cm/s    Right ICA prox mcknight 12.4 cm/s    Right eca sys 58.5 cm/s    RIGHT EXTERNAL CAROTID ARTERY D 5.91 cm/s    Right subclavian sys 63.4 cm/s    RIGHT SUBCLAVIAN ARTERY D 0.00 cm/s    Right vertebral sys 52.1 cm/s    RIGHT VERTEBRAL ARTERY D 9.67 cm/s   GLUCOSE, POC    Collection Time: 01/22/21  8:35 PM   Result Value Ref Range    Glucose (POC) 120 (H) 65 - 100 mg/dL    Performed by [facility]        Physical Exam    Neuro Physical Exam      General: Well developed, well nourished. Patient in no apparent distress. Neurological Exam:  Mental Status: Awake, alert, oriented x4   Cranial Nerves:   Intact visual is[place]. Pamalee Bucker PERRL, EOM's full, no nystagmus, no ptosis. Facial sensation is normal. Facial movement is symmetric. Palate is midline. Normal sternocleidomastoid strength. Tongue is midline. Hearing is intact bilaterally. Motor:  5/5 strength in upper and lower proximal and distal musclesin arms. Legs are 2/5 b/l    Reflexes:   Deep tendon reflexes 2+/4 and symmetrical.   Sensory:   Normal to light touch   Gait:  Not tested    Tremor:   No tremor noted. Cerebellar:  No cerebellar signs present.   :        Assessment and Plan: Ms. Sera Fernandez is a 77year old woman with a punctate stroke. This was unlikely the cause of her AMs which has nor resolved.      - on xarelto (for possible A fib?) and high dose statin   - echo and TTE WNL  - pt/ot  - Hga1c and lipid panel  - BP goal < 150/90

## 2023-05-21 RX ORDER — ACETAMINOPHEN 500 MG
500 TABLET ORAL
COMMUNITY
Start: 2021-01-25

## 2023-05-21 RX ORDER — FLUTICASONE PROPIONATE AND SALMETEROL 100; 50 UG/1; UG/1
1 POWDER RESPIRATORY (INHALATION) 2 TIMES DAILY
COMMUNITY
Start: 2021-01-25

## 2023-05-21 RX ORDER — ATORVASTATIN CALCIUM 20 MG/1
10 TABLET, FILM COATED ORAL NIGHTLY
COMMUNITY

## 2023-05-21 RX ORDER — ALBUTEROL SULFATE 90 UG/1
2 AEROSOL, METERED RESPIRATORY (INHALATION) EVERY 4 HOURS PRN
COMMUNITY
Start: 2021-01-25

## 2023-05-21 RX ORDER — DILTIAZEM HYDROCHLORIDE 120 MG/1
120 CAPSULE, EXTENDED RELEASE ORAL DAILY
COMMUNITY
Start: 2021-01-26

## 2023-05-21 RX ORDER — LOSARTAN POTASSIUM 50 MG/1
50 TABLET ORAL DAILY
COMMUNITY
Start: 2021-01-26

## 2023-05-21 RX ORDER — FAMOTIDINE 20 MG/1
20 TABLET, FILM COATED ORAL EVERY 12 HOURS
COMMUNITY
Start: 2021-01-25

## 2023-05-21 RX ORDER — BACLOFEN 5 MG/1
5 TABLET ORAL 3 TIMES DAILY
COMMUNITY
Start: 2021-01-25

## 2023-05-21 RX ORDER — ASPIRIN 81 MG/1
81 TABLET, CHEWABLE ORAL DAILY
COMMUNITY

## 2023-05-21 RX ORDER — BISACODYL 5 MG/1
5 TABLET, DELAYED RELEASE ORAL DAILY PRN
COMMUNITY
Start: 2021-01-25

## 2023-05-21 RX ORDER — FLECAINIDE ACETATE 50 MG/1
50 TABLET ORAL 2 TIMES DAILY
COMMUNITY
Start: 2021-01-25